# Patient Record
Sex: FEMALE | Race: NATIVE HAWAIIAN OR OTHER PACIFIC ISLANDER | NOT HISPANIC OR LATINO | Employment: FULL TIME | URBAN - METROPOLITAN AREA
[De-identification: names, ages, dates, MRNs, and addresses within clinical notes are randomized per-mention and may not be internally consistent; named-entity substitution may affect disease eponyms.]

---

## 2018-07-09 ENCOUNTER — TRANSFERRED RECORDS (OUTPATIENT)
Dept: HEALTH INFORMATION MANAGEMENT | Facility: CLINIC | Age: 33
End: 2018-07-09

## 2018-09-04 RX ORDER — CITALOPRAM HYDROBROMIDE 10 MG/1
10 TABLET ORAL DAILY
Status: ON HOLD | COMMUNITY
End: 2018-09-05

## 2018-09-05 ENCOUNTER — ANESTHESIA EVENT (OUTPATIENT)
Dept: SURGERY | Facility: CLINIC | Age: 33
End: 2018-09-05
Payer: COMMERCIAL

## 2018-09-05 ENCOUNTER — SURGERY (OUTPATIENT)
Age: 33
End: 2018-09-05

## 2018-09-05 ENCOUNTER — HOSPITAL ENCOUNTER (OUTPATIENT)
Facility: CLINIC | Age: 33
Discharge: HOME | End: 2018-09-05
Attending: OBSTETRICS & GYNECOLOGY | Admitting: OBSTETRICS & GYNECOLOGY
Payer: COMMERCIAL

## 2018-09-05 ENCOUNTER — ANESTHESIA (OUTPATIENT)
Dept: SURGERY | Facility: CLINIC | Age: 33
End: 2018-09-05
Payer: COMMERCIAL

## 2018-09-05 VITALS
BODY MASS INDEX: 35.75 KG/M2 | HEIGHT: 64 IN | RESPIRATION RATE: 18 BRPM | OXYGEN SATURATION: 96 % | WEIGHT: 209.4 LBS | SYSTOLIC BLOOD PRESSURE: 105 MMHG | TEMPERATURE: 97.2 F | DIASTOLIC BLOOD PRESSURE: 50 MMHG

## 2018-09-05 DIAGNOSIS — T40.2X5A CONSTIPATION DUE TO OPIOID THERAPY: ICD-10-CM

## 2018-09-05 DIAGNOSIS — G89.18 ACUTE POST-OPERATIVE PAIN: Primary | ICD-10-CM

## 2018-09-05 DIAGNOSIS — K59.03 CONSTIPATION DUE TO OPIOID THERAPY: ICD-10-CM

## 2018-09-05 PROBLEM — R10.2 PELVIC PAIN IN FEMALE: Status: ACTIVE | Noted: 2018-09-05

## 2018-09-05 LAB
B-HCG SERPL-ACNC: <1 IU/L (ref 0–5)
HGB BLD-MCNC: 12.3 G/DL (ref 11.7–15.7)

## 2018-09-05 PROCEDURE — 37000008 ZZH ANESTHESIA TECHNICAL FEE, 1ST 30 MIN: Performed by: OBSTETRICS & GYNECOLOGY

## 2018-09-05 PROCEDURE — 25000125 ZZHC RX 250: Performed by: OBSTETRICS & GYNECOLOGY

## 2018-09-05 PROCEDURE — 27210794 ZZH OR GENERAL SUPPLY STERILE: Performed by: OBSTETRICS & GYNECOLOGY

## 2018-09-05 PROCEDURE — 25000566 ZZH SEVOFLURANE, EA 15 MIN: Performed by: OBSTETRICS & GYNECOLOGY

## 2018-09-05 PROCEDURE — 25000128 H RX IP 250 OP 636: Performed by: NURSE ANESTHETIST, CERTIFIED REGISTERED

## 2018-09-05 PROCEDURE — 88305 TISSUE EXAM BY PATHOLOGIST: CPT | Mod: 26 | Performed by: OBSTETRICS & GYNECOLOGY

## 2018-09-05 PROCEDURE — 36000085 ZZH SURGERY LEVEL 8 1ST 30 MIN: Performed by: OBSTETRICS & GYNECOLOGY

## 2018-09-05 PROCEDURE — 25000125 ZZHC RX 250: Performed by: NURSE ANESTHETIST, CERTIFIED REGISTERED

## 2018-09-05 PROCEDURE — 25800025 ZZH RX 258: Performed by: OBSTETRICS & GYNECOLOGY

## 2018-09-05 PROCEDURE — 25000128 H RX IP 250 OP 636: Performed by: OBSTETRICS & GYNECOLOGY

## 2018-09-05 PROCEDURE — 71000012 ZZH RECOVERY PHASE 1 LEVEL 1 FIRST HR: Performed by: OBSTETRICS & GYNECOLOGY

## 2018-09-05 PROCEDURE — 27210995 ZZH RX 272: Performed by: OBSTETRICS & GYNECOLOGY

## 2018-09-05 PROCEDURE — 84702 CHORIONIC GONADOTROPIN TEST: CPT | Performed by: OBSTETRICS & GYNECOLOGY

## 2018-09-05 PROCEDURE — 36415 COLL VENOUS BLD VENIPUNCTURE: CPT | Performed by: OBSTETRICS & GYNECOLOGY

## 2018-09-05 PROCEDURE — 71000013 ZZH RECOVERY PHASE 1 LEVEL 1 EA ADDTL HR: Performed by: OBSTETRICS & GYNECOLOGY

## 2018-09-05 PROCEDURE — 88305 TISSUE EXAM BY PATHOLOGIST: CPT | Performed by: OBSTETRICS & GYNECOLOGY

## 2018-09-05 PROCEDURE — 85018 HEMOGLOBIN: CPT | Performed by: OBSTETRICS & GYNECOLOGY

## 2018-09-05 PROCEDURE — 36000087 ZZH SURGERY LEVEL 8 EA 15 ADDTL MIN: Performed by: OBSTETRICS & GYNECOLOGY

## 2018-09-05 PROCEDURE — 37000009 ZZH ANESTHESIA TECHNICAL FEE, EACH ADDTL 15 MIN: Performed by: OBSTETRICS & GYNECOLOGY

## 2018-09-05 PROCEDURE — 40000170 ZZH STATISTIC PRE-PROCEDURE ASSESSMENT II: Performed by: OBSTETRICS & GYNECOLOGY

## 2018-09-05 RX ORDER — LIDOCAINE HYDROCHLORIDE 20 MG/ML
INJECTION, SOLUTION INFILTRATION; PERINEURAL PRN
Status: DISCONTINUED | OUTPATIENT
Start: 2018-09-05 | End: 2018-09-05

## 2018-09-05 RX ORDER — MEPERIDINE HYDROCHLORIDE 25 MG/ML
12.5 INJECTION INTRAMUSCULAR; INTRAVENOUS; SUBCUTANEOUS
Status: DISCONTINUED | OUTPATIENT
Start: 2018-09-05 | End: 2018-09-05 | Stop reason: HOSPADM

## 2018-09-05 RX ORDER — HYDROMORPHONE HYDROCHLORIDE 1 MG/ML
.3-.5 INJECTION, SOLUTION INTRAMUSCULAR; INTRAVENOUS; SUBCUTANEOUS EVERY 10 MIN PRN
Status: DISCONTINUED | OUTPATIENT
Start: 2018-09-05 | End: 2018-09-05 | Stop reason: HOSPADM

## 2018-09-05 RX ORDER — ONDANSETRON 4 MG/1
4 TABLET, ORALLY DISINTEGRATING ORAL EVERY 6 HOURS PRN
Status: DISCONTINUED | OUTPATIENT
Start: 2018-09-05 | End: 2018-09-05 | Stop reason: HOSPADM

## 2018-09-05 RX ORDER — METOCLOPRAMIDE HYDROCHLORIDE 5 MG/ML
10 INJECTION INTRAMUSCULAR; INTRAVENOUS EVERY 6 HOURS PRN
Status: DISCONTINUED | OUTPATIENT
Start: 2018-09-05 | End: 2018-09-05 | Stop reason: HOSPADM

## 2018-09-05 RX ORDER — HYDROMORPHONE HYDROCHLORIDE 1 MG/ML
0.2 INJECTION, SOLUTION INTRAMUSCULAR; INTRAVENOUS; SUBCUTANEOUS
Status: DISCONTINUED | OUTPATIENT
Start: 2018-09-05 | End: 2018-09-05 | Stop reason: HOSPADM

## 2018-09-05 RX ORDER — NALOXONE HYDROCHLORIDE 0.4 MG/ML
.1-.4 INJECTION, SOLUTION INTRAMUSCULAR; INTRAVENOUS; SUBCUTANEOUS
Status: DISCONTINUED | OUTPATIENT
Start: 2018-09-05 | End: 2018-09-05 | Stop reason: HOSPADM

## 2018-09-05 RX ORDER — KETOROLAC TROMETHAMINE 30 MG/ML
INJECTION, SOLUTION INTRAMUSCULAR; INTRAVENOUS PRN
Status: DISCONTINUED | OUTPATIENT
Start: 2018-09-05 | End: 2018-09-05

## 2018-09-05 RX ORDER — SODIUM CHLORIDE, SODIUM LACTATE, POTASSIUM CHLORIDE, CALCIUM CHLORIDE 600; 310; 30; 20 MG/100ML; MG/100ML; MG/100ML; MG/100ML
INJECTION, SOLUTION INTRAVENOUS CONTINUOUS PRN
Status: DISCONTINUED | OUTPATIENT
Start: 2018-09-05 | End: 2018-09-05

## 2018-09-05 RX ORDER — CALCIUM CARBONATE 500 MG/1
1000 TABLET, CHEWABLE ORAL 4 TIMES DAILY PRN
Status: DISCONTINUED | OUTPATIENT
Start: 2018-09-05 | End: 2018-09-05 | Stop reason: HOSPADM

## 2018-09-05 RX ORDER — OXYCODONE AND ACETAMINOPHEN 5; 325 MG/1; MG/1
1-2 TABLET ORAL EVERY 4 HOURS PRN
Qty: 30 TABLET | Refills: 0 | Status: SHIPPED | OUTPATIENT
Start: 2018-09-05 | End: 2021-08-03

## 2018-09-05 RX ORDER — DEXAMETHASONE SODIUM PHOSPHATE 4 MG/ML
INJECTION, SOLUTION INTRA-ARTICULAR; INTRALESIONAL; INTRAMUSCULAR; INTRAVENOUS; SOFT TISSUE PRN
Status: DISCONTINUED | OUTPATIENT
Start: 2018-09-05 | End: 2018-09-05

## 2018-09-05 RX ORDER — PROPOFOL 10 MG/ML
INJECTION, EMULSION INTRAVENOUS PRN
Status: DISCONTINUED | OUTPATIENT
Start: 2018-09-05 | End: 2018-09-05

## 2018-09-05 RX ORDER — BUPIVACAINE HYDROCHLORIDE AND EPINEPHRINE 2.5; 5 MG/ML; UG/ML
INJECTION, SOLUTION INFILTRATION; PERINEURAL PRN
Status: DISCONTINUED | OUTPATIENT
Start: 2018-09-05 | End: 2018-09-05 | Stop reason: HOSPADM

## 2018-09-05 RX ORDER — ONDANSETRON 2 MG/ML
4 INJECTION INTRAMUSCULAR; INTRAVENOUS EVERY 30 MIN PRN
Status: DISCONTINUED | OUTPATIENT
Start: 2018-09-05 | End: 2018-09-05 | Stop reason: HOSPADM

## 2018-09-05 RX ORDER — ONDANSETRON 2 MG/ML
INJECTION INTRAMUSCULAR; INTRAVENOUS PRN
Status: DISCONTINUED | OUTPATIENT
Start: 2018-09-05 | End: 2018-09-05

## 2018-09-05 RX ORDER — KETOROLAC TROMETHAMINE 30 MG/ML
30 INJECTION, SOLUTION INTRAMUSCULAR; INTRAVENOUS EVERY 6 HOURS
Status: DISCONTINUED | OUTPATIENT
Start: 2018-09-05 | End: 2018-09-05 | Stop reason: HOSPADM

## 2018-09-05 RX ORDER — HYDROXYZINE HYDROCHLORIDE 25 MG/1
25 TABLET, FILM COATED ORAL EVERY 6 HOURS PRN
Qty: 30 TABLET | Refills: 0 | Status: SHIPPED | OUTPATIENT
Start: 2018-09-05 | End: 2021-08-03

## 2018-09-05 RX ORDER — EPHEDRINE SULFATE 50 MG/ML
INJECTION, SOLUTION INTRAMUSCULAR; INTRAVENOUS; SUBCUTANEOUS PRN
Status: DISCONTINUED | OUTPATIENT
Start: 2018-09-05 | End: 2018-09-05

## 2018-09-05 RX ORDER — METOCLOPRAMIDE 10 MG/1
10 TABLET ORAL EVERY 6 HOURS PRN
Status: DISCONTINUED | OUTPATIENT
Start: 2018-09-05 | End: 2018-09-05 | Stop reason: HOSPADM

## 2018-09-05 RX ORDER — MAGNESIUM HYDROXIDE 1200 MG/15ML
LIQUID ORAL PRN
Status: DISCONTINUED | OUTPATIENT
Start: 2018-09-05 | End: 2018-09-05 | Stop reason: HOSPADM

## 2018-09-05 RX ORDER — CEFAZOLIN SODIUM 1 G/3ML
1 INJECTION, POWDER, FOR SOLUTION INTRAMUSCULAR; INTRAVENOUS SEE ADMIN INSTRUCTIONS
Status: DISCONTINUED | OUTPATIENT
Start: 2018-09-05 | End: 2018-09-05 | Stop reason: HOSPADM

## 2018-09-05 RX ORDER — PROPOFOL 10 MG/ML
INJECTION, EMULSION INTRAVENOUS CONTINUOUS PRN
Status: DISCONTINUED | OUTPATIENT
Start: 2018-09-05 | End: 2018-09-05

## 2018-09-05 RX ORDER — FENTANYL CITRATE 50 UG/ML
25-50 INJECTION, SOLUTION INTRAMUSCULAR; INTRAVENOUS
Status: DISCONTINUED | OUTPATIENT
Start: 2018-09-05 | End: 2018-09-05 | Stop reason: HOSPADM

## 2018-09-05 RX ORDER — SODIUM CHLORIDE, SODIUM LACTATE, POTASSIUM CHLORIDE, CALCIUM CHLORIDE 600; 310; 30; 20 MG/100ML; MG/100ML; MG/100ML; MG/100ML
INJECTION, SOLUTION INTRAVENOUS CONTINUOUS
Status: DISCONTINUED | OUTPATIENT
Start: 2018-09-05 | End: 2018-09-05 | Stop reason: HOSPADM

## 2018-09-05 RX ORDER — OXYCODONE AND ACETAMINOPHEN 5; 325 MG/1; MG/1
1-2 TABLET ORAL EVERY 4 HOURS PRN
Status: DISCONTINUED | OUTPATIENT
Start: 2018-09-05 | End: 2018-09-05 | Stop reason: HOSPADM

## 2018-09-05 RX ORDER — ONDANSETRON 2 MG/ML
4 INJECTION INTRAMUSCULAR; INTRAVENOUS EVERY 6 HOURS PRN
Status: DISCONTINUED | OUTPATIENT
Start: 2018-09-05 | End: 2018-09-05 | Stop reason: HOSPADM

## 2018-09-05 RX ORDER — ONDANSETRON 4 MG/1
4 TABLET, ORALLY DISINTEGRATING ORAL EVERY 30 MIN PRN
Status: DISCONTINUED | OUTPATIENT
Start: 2018-09-05 | End: 2018-09-05 | Stop reason: HOSPADM

## 2018-09-05 RX ORDER — AMOXICILLIN 250 MG
1-2 CAPSULE ORAL 2 TIMES DAILY
Qty: 30 TABLET | Refills: 0 | Status: SHIPPED | OUTPATIENT
Start: 2018-09-05 | End: 2021-08-03

## 2018-09-05 RX ORDER — NEOSTIGMINE METHYLSULFATE 1 MG/ML
VIAL (ML) INJECTION PRN
Status: DISCONTINUED | OUTPATIENT
Start: 2018-09-05 | End: 2018-09-05

## 2018-09-05 RX ORDER — CEFAZOLIN SODIUM 2 G/100ML
2 INJECTION, SOLUTION INTRAVENOUS
Status: COMPLETED | OUTPATIENT
Start: 2018-09-05 | End: 2018-09-05

## 2018-09-05 RX ORDER — LIDOCAINE 40 MG/G
CREAM TOPICAL
Status: DISCONTINUED | OUTPATIENT
Start: 2018-09-05 | End: 2018-09-05 | Stop reason: HOSPADM

## 2018-09-05 RX ORDER — IBUPROFEN 600 MG/1
600 TABLET, FILM COATED ORAL EVERY 6 HOURS PRN
Qty: 30 TABLET | Refills: 0 | Status: SHIPPED | OUTPATIENT
Start: 2018-09-05 | End: 2021-08-03

## 2018-09-05 RX ORDER — GLYCOPYRROLATE 0.2 MG/ML
INJECTION, SOLUTION INTRAMUSCULAR; INTRAVENOUS PRN
Status: DISCONTINUED | OUTPATIENT
Start: 2018-09-05 | End: 2018-09-05

## 2018-09-05 RX ORDER — HYDROXYZINE HYDROCHLORIDE 25 MG/1
25 TABLET, FILM COATED ORAL EVERY 6 HOURS PRN
Status: DISCONTINUED | OUTPATIENT
Start: 2018-09-05 | End: 2018-09-05 | Stop reason: HOSPADM

## 2018-09-05 RX ORDER — FENTANYL CITRATE 50 UG/ML
INJECTION, SOLUTION INTRAMUSCULAR; INTRAVENOUS PRN
Status: DISCONTINUED | OUTPATIENT
Start: 2018-09-05 | End: 2018-09-05

## 2018-09-05 RX ORDER — IBUPROFEN 600 MG/1
600 TABLET, FILM COATED ORAL EVERY 6 HOURS PRN
Status: DISCONTINUED | OUTPATIENT
Start: 2018-09-06 | End: 2018-09-05 | Stop reason: HOSPADM

## 2018-09-05 RX ADMIN — KETOROLAC TROMETHAMINE 30 MG: 30 INJECTION, SOLUTION INTRAMUSCULAR at 19:45

## 2018-09-05 RX ADMIN — PROPOFOL 75 MCG/KG/MIN: 10 INJECTION, EMULSION INTRAVENOUS at 13:20

## 2018-09-05 RX ADMIN — CEFAZOLIN SODIUM 2 G: 2 INJECTION, SOLUTION INTRAVENOUS at 13:20

## 2018-09-05 RX ADMIN — FENTANYL CITRATE 50 MCG: 50 INJECTION, SOLUTION INTRAMUSCULAR; INTRAVENOUS at 13:35

## 2018-09-05 RX ADMIN — Medication 5 MG: at 13:49

## 2018-09-05 RX ADMIN — HYDROMORPHONE HYDROCHLORIDE 0.3 MG: 1 INJECTION, SOLUTION INTRAMUSCULAR; INTRAVENOUS; SUBCUTANEOUS at 14:39

## 2018-09-05 RX ADMIN — KETOROLAC TROMETHAMINE 30 MG: 30 INJECTION, SOLUTION INTRAMUSCULAR at 14:34

## 2018-09-05 RX ADMIN — HYDROMORPHONE HYDROCHLORIDE 0.5 MG: 1 INJECTION, SOLUTION INTRAMUSCULAR; INTRAVENOUS; SUBCUTANEOUS at 14:58

## 2018-09-05 RX ADMIN — ROCURONIUM BROMIDE 5 MG: 10 INJECTION INTRAVENOUS at 14:23

## 2018-09-05 RX ADMIN — MIDAZOLAM 2 MG: 1 INJECTION INTRAMUSCULAR; INTRAVENOUS at 13:15

## 2018-09-05 RX ADMIN — DEXAMETHASONE SODIUM PHOSPHATE 4 MG: 4 INJECTION, SOLUTION INTRA-ARTICULAR; INTRALESIONAL; INTRAMUSCULAR; INTRAVENOUS; SOFT TISSUE at 13:20

## 2018-09-05 RX ADMIN — ONDANSETRON 4 MG: 2 INJECTION INTRAMUSCULAR; INTRAVENOUS at 13:20

## 2018-09-05 RX ADMIN — ONDANSETRON 4 MG: 2 INJECTION INTRAMUSCULAR; INTRAVENOUS at 14:34

## 2018-09-05 RX ADMIN — WATER 3000 ML: 100 INJECTION, SOLUTION INTRAVENOUS at 14:45

## 2018-09-05 RX ADMIN — SODIUM CHLORIDE 1000 ML: 900 IRRIGANT IRRIGATION at 13:39

## 2018-09-05 RX ADMIN — BUPIVACAINE HYDROCHLORIDE AND EPINEPHRINE BITARTRATE 30 ML: 2.5; .005 INJECTION, SOLUTION EPIDURAL; INFILTRATION; INTRACAUDAL; PERINEURAL at 14:45

## 2018-09-05 RX ADMIN — HYDROMORPHONE HYDROCHLORIDE 0.2 MG: 1 INJECTION, SOLUTION INTRAMUSCULAR; INTRAVENOUS; SUBCUTANEOUS at 14:51

## 2018-09-05 RX ADMIN — PROPOFOL 200 MG: 10 INJECTION, EMULSION INTRAVENOUS at 13:17

## 2018-09-05 RX ADMIN — ROCURONIUM BROMIDE 5 MG: 10 INJECTION INTRAVENOUS at 14:28

## 2018-09-05 RX ADMIN — ROCURONIUM BROMIDE 50 MG: 10 INJECTION INTRAVENOUS at 13:17

## 2018-09-05 RX ADMIN — Medication 0.2 MG: at 17:37

## 2018-09-05 RX ADMIN — SODIUM CHLORIDE, POTASSIUM CHLORIDE, SODIUM LACTATE AND CALCIUM CHLORIDE: 600; 310; 30; 20 INJECTION, SOLUTION INTRAVENOUS at 14:24

## 2018-09-05 RX ADMIN — GLYCOPYRROLATE 0.8 MG: 0.2 INJECTION, SOLUTION INTRAMUSCULAR; INTRAVENOUS at 14:45

## 2018-09-05 RX ADMIN — SODIUM CHLORIDE, POTASSIUM CHLORIDE, SODIUM LACTATE AND CALCIUM CHLORIDE: 600; 310; 30; 20 INJECTION, SOLUTION INTRAVENOUS at 13:10

## 2018-09-05 RX ADMIN — NEOSTIGMINE METHYLSULFATE 5 MG: 1 INJECTION, SOLUTION INTRAVENOUS at 14:45

## 2018-09-05 RX ADMIN — LIDOCAINE HYDROCHLORIDE 40 MG: 20 INJECTION, SOLUTION INFILTRATION; PERINEURAL at 13:17

## 2018-09-05 RX ADMIN — FENTANYL CITRATE 50 MCG: 50 INJECTION, SOLUTION INTRAMUSCULAR; INTRAVENOUS at 13:15

## 2018-09-05 ASSESSMENT — PAIN DESCRIPTION - DESCRIPTORS: DESCRIPTORS: CRAMPING

## 2018-09-05 ASSESSMENT — ENCOUNTER SYMPTOMS: SEIZURES: 0

## 2018-09-05 ASSESSMENT — COPD QUESTIONNAIRES: COPD: 0

## 2018-09-05 NOTE — IP AVS SNAPSHOT
MRN:[id number]                      After Visit Summary   9/5/2018    Evita Fiore    MRN: [id number]           Thank you!     Thank you for choosing [facility] for your care. Our goal is always to provide you with excellent care. Hearing back from our patients is one way we can continue to improve our services. Please take a few minutes to complete the written survey that you may receive in the mail after you visit with us. Thank you!        Patient Information     Date Of Birth          1985        About your hospital stay     You were admitted on:  September 5, 2018 [facility]    You were discharged on:  September 5, 2018       Who to Call     For medical emergencies, please call 911.  For non-urgent questions about your medical care, please call your primary care provider or clinic, [phone]  For questions related to your surgery, please call your surgery clinic        Attending Provider     Provider Specialty    Onel Bergman MD OB/Gyn       Primary Care Provider Office Phone # Fax #    María BLANCA Arreola [phone]      After Care Instructions     Discharge Instructions       Follow up appointment as instructed by Surgeon and or RN            No lifting        No lifting over 25 lbs and no strenuous physical activity for 2 weeks            Shower       No shower for 24 hours post procedure. May shower Postoperative Day (POD)  1                  Pending Results     Date and Time Order Name Status Description    9/5/2018 1358 Surgical pathology exam In process             Statement of Approval     Ordered          09/05/18 1502  I have reviewed and agree with all the recommendations and orders detailed in this document.  EFFECTIVE NOW     Approved and electronically signed by:  Onel Bergman MD             Admission Information     Date & Time Provider Department Dept. Phone    9/5/2018  "Onel Bergman MD [facility] [phone]      Your Vitals Were     Blood Pressure Temperature Respirations Height Weight Last Period    105/50 (BP Location: Right arm) 97.2  F (36.2  C) (Oral) 18 1.626 m (5' 4\") 95 kg (209 lb 6.4 oz) 2018 (Approximate)    Pulse Oximetry BMI (Body Mass Index)                96% 35.94 kg/m2          [employer] Information     [employer] lets you send messages to your doctor, view your test results, renew your prescriptions, schedule appointments and more. To sign up, go to www.[facility]"[employer] . Click on \"Log in\" on the left side of the screen, which will take you to the Welcome page. Then click on \"Sign up Now\" on the right side of the page.     You will be asked to enter the access code listed below, as well as some personal information. Please follow the directions to create your username and password.     Your access code is: [email or web address]  Expires: 2018  6:34 PM     Your access code will  in 90 days. If you need help or a new code, please call your [facility] clinic or [phone].        Care EveryWhere ID     This is your Care EveryWhere ID. This could be used by other organizations to access your [facility] medical records  [id number]        Equal Access to Services     [facility]: Hadii betty العراقي, waaxda lumaureen, qaybta kaalmarubia fuentes . So [facility] [phone].    ATENCIÓN: Si habla español, tiene a jose disposición servicios gratuitos de asistencia lingüística. Lucy al [phone].    We comply with applicable federal civil rights laws and [place] laws. We do not discriminate on the basis of race, color, national origin, age, disability, sex, sexual orientation, or gender identity.               Review of your medicines      START taking        Dose / Directions    hydrOXYzine 25 MG tablet   Commonly known as:  ATARAX   Used for:  Acute post-operative pain        Dose:  25 mg "   Take 1 tablet (25 mg) by mouth every 6 hours as needed for itching (and nausea)   Quantity:  30 tablet   Refills:  0       ibuprofen 600 MG tablet   Commonly known as:  ADVIL/MOTRIN   Used for:  Acute post-operative pain        Dose:  600 mg   Take 1 tablet (600 mg) by mouth every 6 hours as needed for pain (mild)   Quantity:  30 tablet   Refills:  0       oxyCODONE-acetaminophen 5-325 MG per tablet   Commonly known as:  PERCOCET   Used for:  Acute post-operative pain        Dose:  1-2 tablet   Take 1-2 tablets by mouth every 4 hours as needed for pain (moderate to severe)   Quantity:  30 tablet   Refills:  0       senna-docusate 8.6-50 MG per tablet   Commonly known as:  SENOKOT-S;PERICOLACE   Used for:  Constipation due to opioid therapy        Dose:  1-2 tablet   Take 1-2 tablets by mouth 2 times daily Take while on oral narcotics to prevent or treat constipation.   Quantity:  30 tablet   Refills:  0         CONTINUE these medicines which have NOT CHANGED        Dose / Directions    SERTRALINE HCL PO        Dose:  150 mg   Take 150 mg by mouth daily (Takes 1.5 x 100mg tablet = 150mg dose)   Refills:  0         STOP taking     CYTOTEC PO                Where to get your medicines      Some of these will need a paper prescription and others can be bought over the counter. Ask your nurse if you have questions.     Bring a paper prescription for each of these medications     hydrOXYzine 25 MG tablet    ibuprofen 600 MG tablet    oxyCODONE-acetaminophen 5-325 MG per tablet    senna-docusate 8.6-50 MG per tablet                Protect others around you: Learn how to safely use, store and throw away your medicines at www.disposemymeds.org.        Information about OPIOIDS     PRESCRIPTION OPIOIDS: WHAT YOU NEED TO KNOW   We gave you an opioid (narcotic) pain medicine. It is important to manage your pain, but opioids are not always the best choice. You should first try all the other options your care team gave you.  Take this medicine for as short a time (and as few doses) as possible.    Some activities can increase your pain, such as bandage changes or therapy sessions. It may help to take your pain medicine 30 to 60 minutes before these activities. Reduce your stress by getting enough sleep, working on hobbies you enjoy and practicing relaxation or meditation. Talk to your care team about ways to manage your pain beyond prescription opioids.    These medicines have risks:    DO NOT drive when on new or higher doses of pain medicine. These medicines can affect your alertness and reaction times, and you could be arrested for driving under the influence (DUI). If you need to use opioids long-term, talk to your care team about driving.    DO NOT operate heavy machinery    DO NOT do any other dangerous activities while taking these medicines.    DO NOT drink any alcohol while taking these medicines.     If the opioid prescribed includes acetaminophen, DO NOT take with any other medicines that contain acetaminophen. Read all labels carefully. Look for the word  acetaminophen  or  Tylenol.  Ask your pharmacist if you have questions or are unsure.    You can get addicted to pain medicines, especially if you have a history of addiction (chemical, alcohol or substance dependence). Talk to your care team about ways to reduce this risk.    All opioids tend to cause constipation. Drink plenty of water and eat foods that have a lot of fiber, such as fruits, vegetables, prune juice, apple juice and high-fiber cereal. Take a laxative (Miralax, milk of magnesia, Colace, Senna) if you don t move your bowels at least every other day. Other side effects include upset stomach, sleepiness, dizziness, throwing up, tolerance (needing more of the medicine to have the same effect), physical dependence and slowed breathing.    Store your pills in a secure place, locked if possible. We will not replace any lost or stolen medicine. If you don t finish  your medicine, please throw away (dispose) as directed by your pharmacist. The [place] Pollution Control Agency has more information about safe disposal: https://www.pca.[facility].[place].us/living-green/managing-unwanted-medications             Medication List: This is a list of all your medications and when to take them. Check marks below indicate your daily home schedule. Keep this list as a reference.      Medications           Morning Afternoon Evening Bedtime As Needed    hydrOXYzine 25 MG tablet   Commonly known as:  ATARAX   Take 1 tablet (25 mg) by mouth every 6 hours as needed for itching (and nausea)                                   ibuprofen 600 MG tablet   Commonly known as:  ADVIL/MOTRIN   Take 1 tablet (600 mg) by mouth every 6 hours as needed for pain (mild)                                   oxyCODONE-acetaminophen 5-325 MG per tablet   Commonly known as:  PERCOCET   Take 1-2 tablets by mouth every 4 hours as needed for pain (moderate to severe)                                   senna-docusate 8.6-50 MG per tablet   Commonly known as:  SENOKOT-S;PERICOLACE   Take 1-2 tablets by mouth 2 times daily Take while on oral narcotics to prevent or treat constipation.                                      SERTRALINE HCL PO   Take 150 mg by mouth daily (Takes 1.5 x 100mg tablet = 150mg dose)                                             More Information        Taking Opioid Medicines  For your health and safety, it s important to take opioids exactly as directed. This helps ensure that the medicines work as they should. It also lowers the chances of side effects. And it lowers the risk of taking too high a dose (overdose). Each opioid medicine has its own instructions for use. Your healthcare provider will explain the ones you re prescribed and tell you how to take them. If you have questions or concerns, talk with your healthcare provider.   Using opioids safely  Opioids can work very well to relieve pain. But taking  too much, taking them too long, or taking them in the wrong way can be harmful. To help reduce the risks to your health, follow these safety tips:    Make sure you know if you are to take the medicine on a regular basis or only as needed.    If your medicine is taken on a regular basis, take it on time and at the right dose. If you miss a dose, don t double up the next dose.    Use a medicine log, tam, or calendar to keep track of when you take your medicine. This helps you stay on schedule and not miss doses or take extra doses.    When taking liquid doses, use a measuring spoon or dropper. This ensures you take the correct dose.    Tell your healthcare provider right away if you have any side effects.    Don t cut, crush, or change your medicine in any way.    Don t take someone else s opioids. Don t share yours with other people.    Don t drive while taking opioids.    Don t use dangerous equipment or power tools while taking opioids.    Check expiration dates regularly. Dispose of all  medicines properly.   Beware of combining medicines  Some medicines can be dangerous when used with opioids. In some cases, combining medicines can cause death. Make sure to tell your healthcare provider and pharmacist about all of the medicines you re taking. This includes over-the-counter medicines. It also includes vitamins, herbs, and other supplements. And it includes illegal or street drugs. Medicines that may be unsafe to use with opioids include:    Over-the-counter pain relievers, such as acetaminophen    Other prescription opioids    Benzodiazepines such as clonazepam or alprazolam    Muscle relaxants such as cyclobenzaprine or carisoprodol    Hypnotics such as sleep aids like zolpidem  WARNING: Don t take opioids with alcohol or street drugs. This can cause death.     Symptoms of opioid overdose  Opioids act on the part of the brain that affects breathing. An overdose of opioids can slow breathing down too much.  It can even stop your breathing. This can cause death. Call 911 right away if you think you or someone else has had an overdose.   Look for these 3 key symptoms:    The dark circles in the middle of the eyes are very small (pinpoint pupils)    Breathing is slow or stopped    The person has passed out and does not respond(is not conscious)  Other symptoms to look for include:    Limp body    Pale face    Cool, damp skin    Purple or blue tint to lips and fingernails    Vomiting   Storing opioids safely  Opioids need to be stored safely. This helps protect anyone else from accidentally taking the medicine. It also helps prevent the theft and misuse of the medicine. If possible, store the medicine in a locked container or cupboard that others cannot get to. Store the medicine in a cool dry place. Don t store it in a damp place, such as a bathroom. Always put the medicine back in its secure place after each use.   How to dispose opioids  Dispose of unused or  opioids in a safe way. This is to prevent harm to other people. Don t save your medicine or give it to other people for any reason. Even a single dose of opioids can lead to death if it used by someone else. To dispose of your medicine safely:    Find your [facility] medicine take-back program. You may need to drop the medicine off at a local police station or pharmacy.    Ask your pharmacy about a mail-back program. You may be able to send the medicine through the mail. This is done using a special envelope.  If these options are not available to you, ask your healthcare provider for help.                           The FDA also has guidelines for disposing of medicines. You may be able to flush them down the toilet. Or you may be able to put them in the trash. Check with your local water and waste management company to see what is allowed in your city or state. You can learn more here:  www.fda.gov/drugs/resourcesforyou/consumers/buyingusingmedicinesafely/ensuringsafeuseofmedicine/safedisposalofmedicines/bnd961934.htm. Before using these options, check with your local water and waste management company to determine if this is allowed in your city or state.    Stopping opioid treatment  If you have been taking an opioid for more than a few weeks, your body gets used to having it. When you stop taking the medicine, you can have withdrawal symptoms. There are many kinds of withdrawal symptoms. They can range from mild to severe. They can include:    Restlessness    Anxiety    Muscle aches    Sweating    Large (dilated) pupils    Watery eyes    Runny nose    Trouble sleeping    Nausea and vomiting    Abdominal cramping    Diarrhea    Fast heartbeat  Don t stop opioid medicine without help from your healthcare provider. You will need a plan to safely stop taking it. This is to help manage withdrawal symptoms. In most cases, the amount of medicine you take will be cut down. You will take less and less over several weeks. If needed, you may need to take other medicines and treatments to help with this process. As the opioid medicine leaves your body, your body will adapt. Your withdrawal symptoms should then go away. How long this takes can vary for each person.  Date Last Reviewed: 8/1/2017 2000-2017 [employer]. [address] [place] [address], [place] [address]. All rights reserved. This information is not intended as a substitute for professional medical care. Always follow your healthcare professional's instructions.                Managing Post-Op Pain at Home  Pain is expected after surgery. Know that you have a right to have this pain controlled. Managing pain helps you recover faster. Less pain means you can be active sooner. It also means less stress on the body and mind, which will help your body heal. When you go home after surgery, you will take charge of your pain management.  What  is post-op pain?  Pain after surgery (post-op pain) is normal. How much pain you feel depends on the surgery. Your use of pain medicines and your sensitivity to pain are also factors. Each person feels pain differently. So try not to compare your pain with someone else s. Your healthcare team will need to know how you are feeling. Be honest. If you are in pain, say so.  Measuring your pain  A pain scale helps you rate pain intensity. In the scale, 0 means no pain, and 10 is the worst pain possible. Pain scales are not used to compare your pain with another person's pain. A pain scale is used only to measure how your pain changes for you. You should rate your pain every few hours. You may feel some pain even with medicines. It is important to tell your healthcare provider if medicines don't reduce the pain. Be sure to mention if the pain suddenly increases or changes.     Your recovery  Your first hours at home, you may feel groggy or tired from the medicines given during surgery. As pain management methods used during surgery wear off, pain may increase. So be sure not to skip a dose of prescribed medicine. In fact, set an alarm or have someone remind you when it s time to take your medicine. During this time, try to rest, even if you feel pretty good. Within the first 24 hours, a nurse or other healthcare provider is likely to call and ask how you re doing.   Medicines for pain  Medicines can help to block pain, limit swelling and control related problems. You may be given more than one medicine to treat your pain. Medicines may be changed as you feel better, or if they cause side effects.   Pain medicine can be given in several ways: by injection, by mouth, as a patch, or as a suppository.  Medicines What they do Possible side effects   Non-steroidal anti-inflammatory drugs (NSAIDs) Reduce mild to moderate pain. They also help reduce swelling. Nausea, stomach and digestive problems, and kidney and liver problems.  Certain NSAIDs may increase the risk for cardiovascular disease in some people.   Opioids (morphine and similar medicines often called narcotics) Reduce moderate to severe pain Nausea, vomiting, itching, drowsiness, constipation, slowed or shallow breathing   Other pain relievers (analgesics) Reduce mild to severe pain Constipation, nausea, dizziness, drowsiness, kidney and liver problems   Anti-vomiting medicine (antiemetics) Manage nausea Dizziness, drowsiness, muscle spasms   Seizure medicines (anticonvulsants) Manage nerve-related pain Drowsiness, dizziness, liver problems   Medicines for depression (antidepressants) Manage chronic pain Dry mouth, drowsiness, dizziness, constipation   Non-medicine pain relief  Medicines are not the only way to manage pain after surgery.   You can use ice to help reduce swelling and pain. Use a cold pack or bag of ice cubes wrapped in a thin cloth. Never put ice directly on your skin. Use the ice for up to 20 minutes at a time every 3 to 4 hours.   You can also reduce swelling and pain by keeping the operated area above the level of your heart if you can. This helps blood and other fluids drain from the area.   You can also use relaxation to reduce pain. Try these techniques:    Visualization or guided imagery. You picture yourself in a quiet, peaceful place to help take your mind off the pain.    Progressive body relaxation. Starting at your feet, you clench and release your muscles. Work up your body slowly until you reach your neck and face.    Deep breathing. Breathe in deeply and hold your breath for a few seconds. Then exhale slowly to help relax your body.   Tips for controlling pain    Give pain medicine time to work. Most pain relievers taken by mouth need at least 20 to 30 minutes to take effect. They may not reach their maximum effect for close to an hour.     Take pain medicine at regular times as directed. Don t wait until the pain gets bad to take it.    Get plenty  of rest. Taking your medicine at night may help you get a good night s rest.    If pain lessens, try taking your medicine less often or in smaller doses.  Safety tips for taking pain medicines    Ask your pharmacist if you need to take the medicine with food or milk to avoid an upset stomach.    Don t break, crush, or cut in half any long-acting medicines. This could be harmful.    Don t take more medicine than directed. If your pain isn t relieved, call your healthcare provider.    Try to time your medicine so that you take it before starting an activity, such as dressing or sitting at the table for dinner.    Constipation is a common side effect with some pain medicines. Eating fruit, vegetables, and other foods high in fiber can help. Also drink plenty of fluids.    Don t drink alcohol while you are taking pain medicine. This can make you dizzy and slow your breathing. It can even be fatal.    Don t drive if you are taking opioid medicines.    Don t take opioids in combination with benzodiazepines. Doing so can cause serious health problems. These include extreme sleepiness, slowed breathing, and death. Let your healthcare provider know if you are taking benzodiazepines.     When to call your healthcare provider  Call your healthcare provider right away if:    Your pain is not relieved or if it gets worse    You can't take your pain medicines as prescribed    You have severe side effects like breathing problems, trouble waking up, dizziness, confusion, or severe constipation   Date Last Reviewed: 12/1/2017 2000-2017 [employer]. [address] [place] [address], [place] [address]. All rights reserved. This information is not intended as a substitute for professional medical care. Always follow your healthcare professional's instructions.                Discharge Instructions for Laparoscopic Hysterectomy  You had a procedure called laparoscopic hysterectomy. A surgeon removed your uterus using instruments  inserted through small incisions in your abdomen. These incisions may be sore. You may also have pain in your upper back or shoulders. This is from the gas used to enlarge your abdomen to allow your healthcare provider to see inside your pelvis and do the procedure. This pain usually goes away in a day or two. It usually takes from 1 to 4 weeks to recover from laparoscopic hysterectomy. Remember, though, that recovery time varies from woman to woman. Here's what you can do to speed your recovery after surgery.  Home care     Continue the coughing and deep breathing exercises that you learned in the hospital.    Take your medicines exactly as directed by your healthcare provider.    Avoid constipation.  ? Eat fruits, vegetables, and whole grains.  ? Drink 6 to 8 glasses of water a day, unless told to do otherwise.  ? Use a laxative or a mild stool softener if your doctor says it's OK.    Shower as usual. Wash your incisions with mild soap and water. Pat dry.    Don't use oils, powders, or lotions on your incisions.    Don't put anything in your vagina until your healthcare provider says it's safe to do so. Don't use tampons or douches. Don't have sex.    If you had both ovaries removed, report hot flashes, mood swings, and irritability to your healthcare provider. There may be medicines that can help you.  Activity    Ask your healthcare provider when you can start driving again. It's usually OK to drive as soon as you are free of pain and able to move comfortably from side to side. Don't drive while you are still taking opioid pain medicine.    Ask others to help with chores and errands while you recover.    Don t lift anything heavier than 10 pounds for 6 weeks.    Don t vacuum or do other strenuous activities until the healthcare provider says it's OK.    Walk as often as you feel able.    Climb stairs slowly and pause after every few steps.  Follow-up care  Make a follow-up appointment, or as directed.     When  to call your healthcare provider  Call your doctor right away if you have any of the following:    Fever above 100.4 F (38 C) or chills    Bright red vaginal bleeding or vaginal bleeding that soaks more than one sanitary pad per hour    A foul smelling discharge from the vagina    Trouble urinating or burning when you urinate    Severe pain or bloating in your abdomen    Redness, swelling, or drainage at your incision sites    Shortness of breath or chest pain    Nausea and vomiting   Date Last Reviewed: 11/1/2017 2000-2017 [employer]. [address] [place] [id number]. All rights reserved. This information is not intended as a substitute for professional medical care. Always follow your healthcare professional's instructions.

## 2018-09-05 NOTE — PLAN OF CARE
Problem: Patient Care Overview  Goal: Plan of Care/Patient Progress Review  Outcome: No Change  Pt arrived from PACU today at 1715. A/o. VSS. Pain at 6/10. Prn dilaudid given. Ice pack applied. Lap site CDI. On clear liquid diet. RA. capno on. IV infusing. PCD on. Will monitor

## 2018-09-05 NOTE — OR NURSING
PNDS Criteria met.  Order received per Dr Kumar to transfer to Observational Care for continued recovery.  Awaiting availability of room.

## 2018-09-05 NOTE — ANESTHESIA CARE TRANSFER NOTE
Patient: Evita Fiore    Procedure(s):  DAVINCI LAPAROSCOPY, EXCISION OF ENDOMETRIOSIS, BILATERAL URETEROLYSIS, HYSTEROSCOPY, DILATION AND CURETTAGE, CYSTOSCOPY - Wound Class: I-Clean   - Wound Class: II-Clean Contaminated   - Wound Class: II-Clean Contaminated    Diagnosis: PELVIC PAIN  Diagnosis Additional Information: No value filed.    Anesthesia Type:   General, ETT     Note:  Airway :Nasal Cannula  Patient transferred to:PACU  Handoff Report: Identifed the Patient, Identified the Reponsible Provider, Reviewed the pertinent medical history, Discussed the surgical course, Reviewed Intra-OP anesthesia mangement and issues during anesthesia, Set expectations for post-procedure period and Allowed opportunity for questions and acknowledgement of understanding      Vitals: (Last set prior to Anesthesia Care Transfer)    CRNA VITALS  9/5/2018 1428 - 9/5/2018 1508      9/5/2018             Resp Rate (observed): (!)  2    Resp Rate (set): 10                Electronically Signed By: NOEMI Rick CRNA  September 5, 2018  3:08 PM

## 2018-09-05 NOTE — ANESTHESIA PREPROCEDURE EVALUATION
Procedure: Procedure(s):  DAVINCI ASSISTED ABLATION / EXCISION OF ENDOMETRIOSIS  COMBINED DILATION AND CURETTAGE, HYSTEROSCOPY DIAGNOSTIC  CYSTOSCOPY  Preop diagnosis: PELVIC PAIN    Not on File  History reviewed. No pertinent past medical history.  Past Surgical History:   Procedure Laterality Date     ENT SURGERY      oral     GYN SURGERY      hysteroscopy, d and c     Social History   Substance Use Topics     Smoking status: Former Smoker     Packs/day: 1.00     Smokeless tobacco: Never Used      Comment: one pack every 3 days     Alcohol use Yes      Comment: once per month-4 drinks     Prior to Admission medications    Medication Sig Start Date End Date Taking? Authorizing Provider   SERTRALINE HCL PO Take 150 mg by mouth daily (Takes 1.5 x 100mg tablet = 150mg dose)   Yes Reported, Patient   MiSOPROStol (CYTOTEC PO) Take 200 mcg by mouth once    Reported, Patient     Current Facility-Administered Medications Ordered in Epic   Medication Dose Route Frequency Last Rate Last Dose     ceFAZolin (ANCEF) 1 g vial to attach to  ml bag for ADULT or 50 ml bag for PEDS  1 g Intravenous See Admin Instructions         ceFAZolin (ANCEF) intermittent infusion 2 g in 100 mL dextrose PRE-MIX  2 g Intravenous Pre-Op/Pre-procedure x 1 dose         No current [facility]-ordered outpatient prescriptions on file.       Wt Readings from Last 1 Encounters:   09/05/18 95 kg (209 lb 6.4 oz)     Temp Readings from Last 1 Encounters:   09/05/18 35.9  C (96.7  F) (Oral)     BP Readings from Last 6 Encounters:   09/05/18 (!) 146/104   09/04/11 144/90     Pulse Readings from Last 4 Encounters:   09/04/11 88     Resp Readings from Last 1 Encounters:   09/05/18 16     SpO2 Readings from Last 1 Encounters:   09/05/18 98%     No results for input(s): NA, POTASSIUM, CHLORIDE, CO2, ANIONGAP, GLC, BUN, CR, AURA in the last 37654 hours.  No results for input(s): AST, ALT, ALKPHOS, BILITOTAL, LIPASE in the last 63738 hours.  Recent Labs   Lab Test   09/05/18   1118   HGB  12.3         Anesthesia Evaluation     . Pt has had prior anesthetic.     No history of anesthetic complications          ROS/MED HX    ENT/Pulmonary:      (-) asthma, COPD and sleep apnea   Neurologic:      (-) seizures and CVA   Cardiovascular:        (-) dyslipidemia   METS/Exercise Tolerance:     Hematologic:         Musculoskeletal:         GI/Hepatic:        (-) GERD and liver disease   Renal/Genitourinary:      (-) renal disease   Endo:         Psychiatric:     (+) psychiatric history depression      Infectious Disease:         Malignancy:         Other:                     Physical Exam  Normal systems: dental    Airway   Mallampati: II  TM distance: >3 FB  Neck ROM: full    Dental     Cardiovascular   Rhythm and rate: regular      Pulmonary    breath sounds clear to auscultation                    Anesthesia Plan      History & Physical Review  History and physical reviewed and following examination; no interval change.    ASA Status:  1 .    NPO Status:  > 8 hours    Plan for General and ETT   PONV prophylaxis:  Ondansetron (or other 5HT-3) and Dexamethasone or Solumedrol  Propofol gtt       Postoperative Care      Consents  Anesthetic plan, risks, benefits and alternatives discussed with:  Patient..                          .

## 2018-09-05 NOTE — BRIEF OP NOTE
[facility] Brief Operative Note    Pre-operative diagnosis: PELVIC PAIN   Post-operative diagnosis Same, Deeply invasive endometriosis   Procedure: Procedure(s):  DAVINCI LAPAROSCOPY, EXCISION OF ENDOMETRIOSIS, BILATERAL URETEROLYSIS, HYSTEROSCOPY, DILATION AND CURETTAGE, CYSTOSCOPY - Wound Class: I-Clean   - Wound Class: II-Clean Contaminated   - Wound Class: II-Clean Contaminated   Surgeon(s): Surgeon(s) and Role:  Panel 1:     * Onel Bergman MD - Primary     * Sadaf Howard PA-C - Assisting    Panel 2:     * Onel Bergman MD - Primary   Estimated blood loss: 15    Specimens:   ID Type Source Tests Collected by Time Destination   A : LEFT UTEROSACRAL LIGAMENT Tissue Other SURGICAL PATHOLOGY EXAM Onel Bergman MD 9/5/2018  1:58 PM    B : LEFT OVARIAN FOSSA Tissue Other SURGICAL PATHOLOGY EXAM Onel Bergman MD 9/5/2018  2:01 PM    C : RIGHT OVARIAN FOSSA Tissue Other SURGICAL PATHOLOGY EXAM Oenl Bergman MD 9/5/2018  2:12 PM    D : POSTERIOR CUL DE SAC RECTOVAGINAL SEPTUM Tissue Other SURGICAL PATHOLOGY EXAM Onel Bergman MD 9/5/2018  2:23 PM    E : RECTOVAGINAL NODULE Tissue Other SURGICAL PATHOLOGY EXAM Onel Bergman MD 9/5/2018  2:27 PM    F : ENDOMETRIAL CURETTINGS Tissue Endometrium SURGICAL PATHOLOGY EXAM Onel Bergman MD 9/5/2018  2:41 PM       Findings: Normal anterior cul de sac  Normal appearing uterus  Normal upper abdomen  Normal tubes and ovaries bilaterally  White fibrotic and black endometriosis in left ovarian fossa  1 cm DIE nodule of left uterosacral ligament  White fibrotic type lesions in right ovarian fossa  1 cm DIE nodule in RV septum  Uterus had multiple polyps  Normal cystoscopy

## 2018-09-05 NOTE — IP AVS SNAPSHOT
[facility] Observation Unit    [address] [place] [id number]    Phone:  [phone]                                       After Visit Summary   9/5/2018    Evita Fiore    MRN: [id number]           After Visit Summary Signature Page     I have received my discharge instructions, and my questions have been answered. I have discussed any challenges I see with this plan with the nurse or doctor.    ..........................................................................................................................................  Patient/Patient Representative Signature      ..........................................................................................................................................  Patient Representative Print Name and Relationship to Patient    ..................................................               ................................................  Date                                            Time    ..........................................................................................................................................  Reviewed by Signature/Title    ...................................................              ..............................................  Date                                                            Time          22EPIC Rev 08/18

## 2018-09-05 NOTE — OR NURSING
Observational Care room now available.  Hand-off report to RN.  To OBS-5 per cart with all belongings.  Will transport with Capnography monitoring.

## 2018-09-05 NOTE — ANESTHESIA POSTPROCEDURE EVALUATION
Patient: Evita Fiore    Procedure(s):  DAVINCI LAPAROSCOPY, EXCISION OF ENDOMETRIOSIS, BILATERAL URETEROLYSIS, HYSTEROSCOPY, DILATION AND CURETTAGE, CYSTOSCOPY - Wound Class: I-Clean   - Wound Class: II-Clean Contaminated   - Wound Class: II-Clean Contaminated    Diagnosis:PELVIC PAIN  Diagnosis Additional Information: No value filed.    Anesthesia Type:  General, ETT    Note:  Anesthesia Post Evaluation    Patient location during evaluation: PACU  Patient participation: Able to fully participate in evaluation  Level of consciousness: awake, awake and alert and responsive to verbal stimuli  Pain management: adequate  Airway patency: patent  Cardiovascular status: acceptable  Respiratory status: acceptable  Hydration status: acceptable  PONV: none     Anesthetic complications: None          Last vitals:  Vitals:    09/05/18 1600 09/05/18 1615 09/05/18 1630   BP: 120/68 126/68 120/70   Resp: 10 (!) 31 9   Temp: 36.9  C (98.4  F) 36.9  C (98.4  F) 36.9  C (98.4  F)   SpO2: 97% 98% 97%         Electronically Signed By: Tatiana Kumar  September 5, 2018  5:01 PM

## 2018-09-05 NOTE — OP NOTE
Procedure Date: 09/05/2018      PREOPERATIVE DIAGNOSIS:  Pelvic pain.      POSTOPERATIVE DIAGNOSES:  Pelvic pain plus deeply invasive endometriosis.      PROCEDURES:   1.  Da Miguelina laparoscopy.   2.  Extensive excision of endometriosis.   3.  Bilateral ureterolysis.   4.  Hysteroscopy with dilatation and curettage.   5.  Cystoscopy.      SURGEON:  Onel Bergman MD      ASSISTANT:  Sadaf Hwoard PA-C      ANESTHESIA:  General.      ESTIMATED BLOOD LOSS:  50 mL.      COMPLICATIONS:  None.      FINDINGS:   1.  There was a normal anterior cul-de-sac.   2.  Normal-appearing uterus.   3.  Normal-appearing tubes and ovaries bilaterally.   4.  There was a normal upper abdomen without evidence of perihepatic adhesions or diaphragmatic endometriosis.   5.  There were spots of white fibrotic and black endometriosis in the left ovarian fossa.   6.  There was a 1 cm deeply invasive nodule involving the distal left uterosacral ligament.   7.  There were scattered white fibrotic type lesions in the right ovarian fossa.   8.  There was a 1 cm deeply invasive endometriosis nodule in the rectovaginal septum.     9.  On hysteroscopy, there were multiple intrauterine polyps.     10.  On cystoscopy, there was no evidence of bladder injury.  There was brisk flow of urine from both ureteral ostia and no evidence of interstitial cystitis.      INDICATIONS FOR PROCEDURE:  The patient is a 32-year-old female with a history of pelvic pain that has been persistent and not improved with more conservative medical measures.  Given this, the decision was to surgically evaluate for endometriosis.      DESCRIPTION OF PROCEDURE:  After administration of anesthesia, the patient was placed in the dorsal lithotomy position, prepped and draped in normal sterile fashion.  A Spain catheter was placed.  A speculum was placed in the vagina.  A single-tooth tenaculum was placed on the anterior lip of the cervix.  Cervical os was dilated with Hegar dilators.   A ZACHERY uterine manipulator was placed and we moved to the abdominal portion of the procedure.  A Veress needle was inserted in the left upper quadrant after first ensuring that there was an orogastric tube.  Pneumoperitoneum was established.        I injected the umbilicus with 0.25% Marcaine with epinephrine and an 8 mm incision was made.  A 5 mm blunt trocar was placed through the umbilicus to its correct intraperitoneal position.  I placed the 5 mm camera.  Under direct visualization, an 8 mm da Miguelina trocar was placed in the right lower quadrant, another 8 mm da Miguelina trocar placed in the left lower quadrant and an 8 mm AirSeal placed in the right upper quadrant as an assistant port.  I replaced the 5 mm trocar with an 8 mm da Miguelina camera trocar.  At this point, patient was placed into steep Trendelenburg and the table brought down as far as possible.  The da Miguelina robot was brought up and docked without complications.  A monopolar hook was placed in the right #1 arm, bipolar fenestrated grasper placed in the left #2 arm.  I scrubbed out and moved to the console.  The pelvis and abdomen were evaluated with the findings as previously mentioned.  After first evaluating the anterior cul-de-sac, I then deviated the uterus ventrally so I could evaluate the posterior compartment.  I had my assistant elevate the ovary on the left side although the uterus was deviated ventrally.  I went ahead and took down some physiologic adhesions of the rectosigmoid to improve my visualization of the left pelvic sidewall.  At this point, I noted the scattered endometriosis in the left ovarian fossa and right ovarian fossa as well as the nodular lesions.  Given the proximity to the ureter, I felt that ureterolysis was necessary to ensure the safety of the ureter.  I entered retroperitoneally at the pelvic brim and identified the ureter.  I elevated the peritoneum up and away from the ureter along its course along the pelvic sidewall.   With the ureter now out of harm's way, I was able to excise the peritoneum left and lateral of the ureter to the base of the ovary and then right and medial to the uterosacral ligament.  This peritoneum was removed en bloc and was sent for permanent pathology.  I then evaluated the nodule on the left uterosacral ligament and given its size, it was necessary to sacrifice the ligament and so this was excised in its entirety without complications.        I then moved to the right side.  In a similar fashion, I entered retroperitoneally at the pelvic brim, identified the ureter and then began to tent the peritoneum up and away from the ureter along its course along the right pelvic sidewall until it dove into the deeper tissues.  With the ureter now out of harm's way, I was able to excise the peritoneum right and lateral of the ureter to the base of the ovary and then left and medial to the uterosacral ligament.  This tissue was then removed and sent for permanent pathology.  I then directed my attention to the posterior cul-de-sac.  There was approximately a 1 cm nodular puckered area.  I started lateral to this and entered retroperitoneally and began to dissect the rectovaginal septum gently, staying in the fatty tissue around it and using virtually no electrocautery.  In this fashion, I was able to move around the nodular area and lift it up and away from underlying normal fatty tissue.  At no time was the rectum in any danger.  When this was completed, the nodule had been removed in its entirety.  This was removed and sent for permanent pathology.        At this point, all areas of abnormality had been removed.  I used some Surgicel powder in the posterior cul-de-sac where there was some oozing as well as a little on the right ovarian fossa.  Pneumoperitoneum was evacuated, instruments and trocars were removed, robot was undocked.  The skin was closed with 4-0 Vicryl in subcuticular fashion.  I scrubbed back in and  moved below.  I removed the uterine manipulator and placed the diagnostic hysteroscope.  I saw that there were multiple polypoid structures and I went ahead and did a circumferential curettage yielding a large amount of tissue.  I then removed the tenaculum and the speculum.  I removed the Spain catheter and placed a diagnostic cystoscope.  There was no evidence of bladder injury.  There was brisk flow of urine from both ureteral ostia and there was no evidence of interstitial cystitis.  The cystoscope was removed and the bladder drained.  A debriefing was performed, specimens identified and patient recalled from anesthesia without difficulty.         DEMARCO KING MD             D: 2018   T: 2018   MT: [id number]      Name:     KING CALLAWAY   MRN:      [id number]        Account:        [id number]   :      1985           Procedure Date: 2018      Document: [id number]

## 2018-09-06 LAB — COPATH REPORT: NORMAL

## 2018-09-06 NOTE — PLAN OF CARE
Problem: Patient Care Overview  Goal: Plan of Care/Patient Progress Review  Outcome: Adequate for Discharge Date Met: 09/05/18  A&O, VSS on RA. Voiding adequately in bathroom. Mild vaginal bleeding. Oral intake adequate, no nausea or vomiting. Pain well controlled. Discharge instructions reviewed with pt and spouse. Discharge meds given to pt. Discharged home with spouse via wheelchair.

## 2018-09-11 ENCOUNTER — HEALTH MAINTENANCE LETTER (OUTPATIENT)
Age: 33
End: 2018-09-11

## 2020-03-01 ENCOUNTER — HEALTH MAINTENANCE LETTER (OUTPATIENT)
Age: 35
End: 2020-03-01

## 2020-12-14 ENCOUNTER — HEALTH MAINTENANCE LETTER (OUTPATIENT)
Age: 35
End: 2020-12-14

## 2021-04-18 ENCOUNTER — HEALTH MAINTENANCE LETTER (OUTPATIENT)
Age: 36
End: 2021-04-18

## 2021-04-21 ENCOUNTER — TRANSFERRED RECORDS (OUTPATIENT)
Dept: MULTI SPECIALTY CLINIC | Facility: CLINIC | Age: 36
End: 2021-04-21

## 2021-04-21 LAB
HPV ABSTRACT: NORMAL
PAP-ABSTRACT: NORMAL

## 2021-08-03 ENCOUNTER — OFFICE VISIT (OUTPATIENT)
Dept: FAMILY MEDICINE | Facility: CLINIC | Age: 36
End: 2021-08-03
Payer: COMMERCIAL

## 2021-08-03 VITALS
HEIGHT: 66 IN | HEART RATE: 75 BPM | DIASTOLIC BLOOD PRESSURE: 77 MMHG | WEIGHT: 214 LBS | BODY MASS INDEX: 34.39 KG/M2 | OXYGEN SATURATION: 98 % | TEMPERATURE: 97.3 F | SYSTOLIC BLOOD PRESSURE: 144 MMHG | RESPIRATION RATE: 12 BRPM

## 2021-08-03 DIAGNOSIS — F90.2 ATTENTION DEFICIT HYPERACTIVITY DISORDER (ADHD), COMBINED TYPE: ICD-10-CM

## 2021-08-03 DIAGNOSIS — E04.1 THYROID NODULE: ICD-10-CM

## 2021-08-03 DIAGNOSIS — N80.9 ENDOMETRIOSIS: ICD-10-CM

## 2021-08-03 DIAGNOSIS — F41.1 ANXIETY STATE: ICD-10-CM

## 2021-08-03 DIAGNOSIS — R59.0 POSTERIOR CERVICAL ADENOPATHY: Primary | ICD-10-CM

## 2021-08-03 PROBLEM — R10.2 PELVIC PAIN IN FEMALE: Status: RESOLVED | Noted: 2018-09-05 | Resolved: 2021-08-03

## 2021-08-03 PROBLEM — F90.9 ATTENTION DEFICIT HYPERACTIVITY DISORDER (ADHD): Status: ACTIVE | Noted: 2021-04-22

## 2021-08-03 PROBLEM — E66.9 OBESITY: Status: ACTIVE | Noted: 2020-01-14

## 2021-08-03 PROBLEM — R21 RASH AND OTHER NONSPECIFIC SKIN ERUPTION: Status: ACTIVE | Noted: 2021-08-03

## 2021-08-03 PROCEDURE — 99214 OFFICE O/P EST MOD 30 MIN: CPT | Performed by: FAMILY MEDICINE

## 2021-08-03 RX ORDER — SERTRALINE HYDROCHLORIDE 100 MG/1
200 TABLET, FILM COATED ORAL DAILY
Qty: 180 TABLET | Refills: 1 | Status: SHIPPED | OUTPATIENT
Start: 2021-08-03 | End: 2022-01-25

## 2021-08-03 RX ORDER — METHYLPHENIDATE HYDROCHLORIDE 18 MG/1
18 TABLET ORAL EVERY MORNING
Qty: 30 TABLET | Refills: 0 | Status: SHIPPED | OUTPATIENT
Start: 2021-08-03 | End: 2021-09-07

## 2021-08-03 ASSESSMENT — MIFFLIN-ST. JEOR: SCORE: 1674.07

## 2021-08-03 NOTE — PROGRESS NOTES
"  Assessment & Plan    1. Posterior cervical adenopathy  Patient has noted a small mobile lump in her lower left neck - present x months.  Palpation reveals a very small lump with benign features, but given its persistence it is reasonable to have this further evaluated.  Will order a CT soft tissue neck scan.  Patient is agreeable.    - CT Soft Tissue Neck w Contrast; Future    2. Endometriosis  Patient relates a long journey through diagnosis of endometriosis.  She has had at least 2 previous surgeries and taking no medications - doing well currently.     3. Thyroid nodule  H/o thyroid nodule - no concerns on previous scans/workup.     4. Anxiety state  Patient has significant anxiety - will increase Sertraline dosing .  (had been stable previously, but feels that increased dowing might be reasonable).     - sertraline (ZOLOFT) 100 MG tablet; Take 2 tablets (200 mg) by mouth daily  Dispense: 180 tablet; Refill: 1    5. Attention deficit hyperactivity disorder (ADHD), combined type  Has used Methylphenidate previously - thinks it would be reasonable to restart - struggling at work   - methylphenidate HCl ER (CONCERTA) 18 MG CR tablet; Take 1 tablet (18 mg) by mouth every morning  Dispense: 30 tablet; Refill: 0      No follow-ups on file.    Chief Complaint   Patient presents with     Establish Care     lump on the neck      HPI  Last visit   Dr. Bergman at [facility] [place] - endometriosis - Stage 4  Had 2 surgeries - it was \"everywhere - attached to rectum/\"all my organs are attached together\"  Trying to manage that with diet/exercise    Keto - helps manage the inflammation and then helps the endometriosis  Also has her on Sertraline 150 mg (100 mg tabs, 1-1/2 tabs daily) - feels like it could be upped  Sees Dr. 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)  When taking meds, gets so much done in a day  Has " "been working from home - hard to concentrate    Lump on her neck -   Got her vaccine in April - (was actually April/May)  Found the lump b/c she was putting sun screen on; had a physical the next day  Watched this lump x 5 weeks  Still there -   Small, \"it moves\" - unchanged  Has pinched nerve in back - thinks they are connected, but has \"always\" had the pinched nerve -     Blood pressure - saw cardiologist - \"white coat\"               Patient Active Problem List   Diagnosis     Endometriosis     Anxiety state     Attention deficit hyperactivity disorder (ADHD)     Hypertension     Rash and other nonspecific skin eruption     Thyroid nodule     Obesity        No past medical history on file.     Current Outpatient Medications   Medication     methylphenidate HCl ER (CONCERTA) 18 MG CR tablet     sertraline (ZOLOFT) 100 MG tablet     No current facility-administered medications for this visit.        Past Surgical History:   Procedure Laterality Date     CYSTOSCOPY N/A 9/5/2018    Procedure: CYSTOSCOPY;;  Surgeon: Onel Bergman MD;  Location:  OR     DAVINCI ASSISTED ABLATION / EXCISION OF ENDOMETRIOSIS N/A 9/5/2018    Procedure: DAVINCI ASSISTED ABLATION / EXCISION OF ENDOMETRIOSIS;  DAVINCI LAPAROSCOPY, EXCISION OF ENDOMETRIOSIS, BILATERAL URETEROLYSIS, HYSTEROSCOPY, DILATION AND CURETTAGE, CYSTOSCOPY;  Surgeon: Onel Bergman MD;  Location:  OR     DILATION AND CURETTAGE, HYSTEROSCOPY DIAGNOSTIC, COMBINED N/A 9/5/2018    Procedure: COMBINED DILATION AND CURETTAGE, HYSTEROSCOPY DIAGNOSTIC;;  Surgeon: Onel Bergman MD;  Location:  OR     ENT SURGERY      oral     GYN SURGERY      hysteroscopy, d and c        Social History     Socioeconomic History     Marital status:      Spouse name: Not on file     Number of children: Not on file     Years of education: Not on file     Highest education level: Not on file   Occupational History     Not on file   Tobacco Use     Smoking " "status: Former Smoker     Packs/day: 1.00     Smokeless tobacco: Never Used     Tobacco comment: one pack every 3 days   Substance and Sexual Activity     Alcohol use: Yes     Comment: once per month-4 drinks     Drug use: No     Sexual activity: Yes     Partners: Female     Birth control/protection: Surgical     Comment:  with vasectomy   Other Topics Concern     Parent/sibling w/ CABG, MI or angioplasty before 65F 55M? Not Asked   Social History Narrative     Not on file     Social Determinants of Health     Financial Resource Strain:      Difficulty of Paying Living Expenses:    Food Insecurity:      Worried About Running Out of Food in the Last Year:      Ran Out of Food in the Last Year:    Transportation Needs:      Lack of Transportation (Medical):      Lack of Transportation (Non-Medical):    Physical Activity:      Days of Exercise per Week:      Minutes of Exercise per Session:    Stress:      Feeling of Stress :    Social Connections:      Frequency of Communication with Friends and Family:      Frequency of Social Gatherings with Friends and Family:      Attends Gn[employer] Services:      Active Member of Clubs or Organizations:      Attends Club or Organization Meetings:      Marital Status:    Intimate Partner Violence:      Fear of Current or Ex-Partner:      Emotionally Abused:      Physically Abused:      Sexually Abused:         No family history on file.     Review of Systems   Gastrointestinal: Negative for abdominal pain.   Hematological: Positive for adenopathy (lump at base of left mid-posterior neck).   Psychiatric/Behavioral: Positive for decreased concentration.   All other systems reviewed and are negative.       BP (!) 144/77 (BP Location: Right arm, Patient Position: Sitting, Cuff Size: Adult Regular)   Pulse 75   Temp 97.3  F (36.3  C) (Temporal)   Resp 12   Ht 1.671 m (5' 5.79\")   Wt 97.1 kg (214 lb)   LMP 07/20/2021   SpO2 98%   Breastfeeding Yes   BMI 34.76 kg/m   "     Physical Exam  Constitutional:       Appearance: Normal appearance.      Comments: Mildly anxious   HENT:      Head: Normocephalic and atraumatic.      Right Ear: Tympanic membrane, ear canal and external ear normal.      Left Ear: Tympanic membrane, ear canal and external ear normal.      Nose: Nose normal.      Mouth/Throat:      Mouth: Mucous membranes are dry.      Pharynx: Oropharynx is clear. No posterior oropharyngeal erythema.   Eyes:      Extraocular Movements: Extraocular movements intact.      Conjunctiva/sclera: Conjunctivae normal.   Cardiovascular:      Rate and Rhythm: Normal rate and regular rhythm.      Pulses: Normal pulses.      Heart sounds: Normal heart sounds. No murmur heard.     Pulmonary:      Effort: Pulmonary effort is normal.      Breath sounds: Normal breath sounds.   Abdominal:      General: Bowel sounds are normal.      Palpations: There is no mass.      Tenderness: There is no abdominal tenderness. There is no guarding or rebound.   Musculoskeletal:         General: No swelling, tenderness or deformity.      Cervical back: Normal range of motion and neck supple.   Lymphadenopathy:      Cervical: No cervical adenopathy (small soft nontender mobile node in left mid/posterior cervical chain).   Skin:     General: Skin is warm and dry.   Neurological:      General: No focal deficit present.      Mental Status: She is alert.   Psychiatric:         Mood and Affect: Mood normal.         Behavior: Behavior normal.          Results:  No results found for any visits on 08/03/21.    Medications at Conclusion of Visit:  Current Outpatient Medications   Medication Sig Dispense Refill     methylphenidate HCl ER (CONCERTA) 18 MG CR tablet Take 1 tablet (18 mg) by mouth every morning 30 tablet 0     sertraline (ZOLOFT) 100 MG tablet Take 2 tablets (200 mg) by mouth daily 180 tablet 1         WILLIAN NEAL MD

## 2021-08-08 ASSESSMENT — ENCOUNTER SYMPTOMS
ABDOMINAL PAIN: 0
ADENOPATHY: 1
DECREASED CONCENTRATION: 1

## 2021-08-16 ENCOUNTER — HOSPITAL ENCOUNTER (OUTPATIENT)
Dept: CT IMAGING | Facility: HOSPITAL | Age: 36
Discharge: HOME | End: 2021-08-16
Attending: FAMILY MEDICINE | Admitting: FAMILY MEDICINE
Payer: COMMERCIAL

## 2021-08-16 DIAGNOSIS — R59.0 POSTERIOR CERVICAL ADENOPATHY: ICD-10-CM

## 2021-08-16 PROCEDURE — 70491 CT SOFT TISSUE NECK W/DYE: CPT

## 2021-08-16 PROCEDURE — 250N000011 HC RX IP 250 OP 636: Performed by: FAMILY MEDICINE

## 2021-08-16 RX ORDER — IOPAMIDOL 755 MG/ML
100 INJECTION, SOLUTION INTRAVASCULAR ONCE
Status: COMPLETED | OUTPATIENT
Start: 2021-08-16 | End: 2021-08-16

## 2021-08-16 RX ADMIN — IOPAMIDOL 100 ML: 755 INJECTION, SOLUTION INTRAVENOUS at 07:29

## 2021-09-07 ENCOUNTER — MYC REFILL (OUTPATIENT)
Dept: FAMILY MEDICINE | Facility: CLINIC | Age: 36
End: 2021-09-07

## 2021-09-07 DIAGNOSIS — F90.2 ATTENTION DEFICIT HYPERACTIVITY DISORDER (ADHD), COMBINED TYPE: ICD-10-CM

## 2021-09-08 RX ORDER — METHYLPHENIDATE HYDROCHLORIDE 18 MG/1
18 TABLET ORAL EVERY MORNING
Qty: 30 TABLET | Refills: 0 | Status: SHIPPED | OUTPATIENT
Start: 2021-09-08 | End: 2023-02-08

## 2021-09-08 NOTE — TELEPHONE ENCOUNTER
Former patient of Elba & has not established care with another provider.  Please assign refill request to covering provider per clinic standard process.    Routing refill request to provider for review/approval because:  Controlled substance request  No pcp    Last Written Prescription Date:  8/3/21  Last Fill Quantity: 30,  # refills: 0   Last office visit provider:  8/3/21     Requested Prescriptions   Pending Prescriptions Disp Refills     methylphenidate HCl ER (CONCERTA) 18 MG CR tablet 30 tablet 0     Sig: Take 1 tablet (18 mg) by mouth every morning       There is no refill protocol information for this order          Meir Hoffman RN 09/08/21 10:23 AM

## 2021-10-02 ENCOUNTER — HEALTH MAINTENANCE LETTER (OUTPATIENT)
Age: 36
End: 2021-10-02

## 2022-01-23 DIAGNOSIS — F41.1 ANXIETY STATE: ICD-10-CM

## 2022-01-25 RX ORDER — SERTRALINE HYDROCHLORIDE 100 MG/1
TABLET, FILM COATED ORAL
Qty: 180 TABLET | Refills: 1 | Status: SHIPPED | OUTPATIENT
Start: 2022-01-25 | End: 2023-02-08 | Stop reason: DRUGHIGH

## 2022-01-25 NOTE — TELEPHONE ENCOUNTER
"Ok to refill or does pt need follow up appt?      Last Written Prescription Date:  08/03/2021  Last Fill Quantity: 180,  # refills: 1   Last office visit provider:   08/03/2021 with dr Arnold.    Requested Prescriptions   Pending Prescriptions Disp Refills     sertraline (ZOLOFT) 100 MG tablet [Pharmacy Med Name: SERTRALINE 100MG TABLETS] 180 tablet 1     Sig: TAKE 2 TABLETS(200 MG) BY MOUTH DAILY       SSRIs Protocol Passed - 1/23/2022  8:00 AM        Passed - Recent (12 mo) or future (30 days) visit within the authorizing provider's specialty     Patient has had an office visit with the authorizing provider or a provider within the authorizing providers department within the previous 12 mos or has a future within next 30 days. See \"Patient Info\" tab in inbasket, or \"Choose Columns\" in Meds & Orders section of the refill encounter.              Passed - Medication is active on med list        Passed - Patient is age 18 or older        Passed - No active pregnancy on record        Passed - No positive pregnancy test in last 12 months             Deirdre Enciso 01/25/22 11:16 AM  "

## 2022-05-14 ENCOUNTER — HEALTH MAINTENANCE LETTER (OUTPATIENT)
Age: 37
End: 2022-05-14

## 2022-06-29 ENCOUNTER — TELEPHONE (OUTPATIENT)
Dept: FAMILY MEDICINE | Facility: CLINIC | Age: 37
End: 2022-06-29

## 2022-06-29 ENCOUNTER — VIRTUAL VISIT (OUTPATIENT)
Dept: FAMILY MEDICINE | Facility: CLINIC | Age: 37
End: 2022-06-29
Payer: COMMERCIAL

## 2022-06-29 DIAGNOSIS — U07.1 LAB TEST POSITIVE FOR DETECTION OF COVID-19 VIRUS: Primary | ICD-10-CM

## 2022-06-29 DIAGNOSIS — E66.09 CLASS 2 OBESITY DUE TO EXCESS CALORIES WITHOUT SERIOUS COMORBIDITY WITH BODY MASS INDEX (BMI) OF 35.0 TO 35.9 IN ADULT: ICD-10-CM

## 2022-06-29 DIAGNOSIS — E66.812 CLASS 2 OBESITY DUE TO EXCESS CALORIES WITHOUT SERIOUS COMORBIDITY WITH BODY MASS INDEX (BMI) OF 35.0 TO 35.9 IN ADULT: ICD-10-CM

## 2022-06-29 PROCEDURE — 99213 OFFICE O/P EST LOW 20 MIN: CPT | Mod: GT | Performed by: FAMILY MEDICINE

## 2022-06-29 NOTE — PATIENT INSTRUCTIONS
"  Instructions for Patients  Your COVID-19 test was positive. This means you have the virus. Please follow the \"How can I take care of myself\" and \"How do I self-isolate?\" guidelines in these instructions.    What treatments are available?  Over-the-counter medicines may help with your symptoms such as runny or stuffy nose, cough, chills, and fever. Talk to your care team about your options.     Some people are at high risk for severe illness (for example if you have a weak immune system, you're 65 or older, or you have certain medical problems). If your risk it high and your symptoms started in the last 5 to 7 days, we strongly recommend for you to get COVID treatment as soon as possible before you get really sick. Paxlovid, Molnupiravir and the monoclonal antibody treatments are proven safe and effective, make you feel better faster, and prevent hospitalization and death.       You can schedule an appointment to discuss COVID treatment by requesting an appointment on [employer] by selecting \"schedule COVID-19 Treatment\" or by calling [employer] ([phone]) and pressing 7.    What are the symptoms of COVID-19?  Symptoms can include fever, cough, shortness of breath, chills, headache, muscle pain sore throat, fatigue, runny or stuffy nose, and loss of taste and smell. Other less common symptoms include nausea, vomiting, or diarrhea (watery stools).    Know when to call 911. Emergency warning signs include:    Trouble breathing or shortness of breath    Pain or pressure in the chest that doesn't go away    Feeling confused like you haven't felt before, or not being able to wake up    Bluish-colored lips or face    How can I take care of myself?  1. Get lots of rest. Drink extra fluids (unless a doctor has told you not to).  2. Take Tylenol (acetaminophen) for fever or pain. If you have liver or kidney problems, ask your family doctor if it's okay to take Tylenol   Adults:   650 mg (two 325 mg pills or tablets) " every 4 to 6 hours, or...   1,000 mg (two 500 mg pills or tablets) every 8 hours as needed.  Note: Don't take more than 3,000 mg in one day. Acetaminophen is found in many medicines (both prescribed and over the counter). Read all labels to be sure you don't take too much.  For children, check the Tylenol bottle for the right dose. The dose is based on the child's age or weight.  3. Take over the counter medicines for your symptoms as needed. Talk to your pharmacist.  4. If you have other health problems (like cancer, heart failure, an organ transplant, or severe kidney disease): Call your specialty clinic if you don't feel better in the next 2 days.    These guidelines are for isolating and quarantining before returning to work, school or .     For employers, schools and day cares: This is an official notice for this person s medical guidelines for returning in-person.     For health care sites: The CDC gives different isolation and quarantine guidelines for healthcare sites, please check with these sites before arriving.     How do I self-isolate?  You isolate when you have symptoms of COVID or a test shows you have COVID, even if you don t have symptoms.     If you DO have symptoms:  o Stay home and away from others  - For at least 5 days after your symptoms started, AND   - You are fever free for 24 hours (with no medicine that reduces fever), AND  - Your other symptoms are better.  o Wear a mask for 10 full days any time you are around others.    If you DON T have symptoms:  o Stay at home and away from others for at least 5 days after your positive test.  o Wear a mask for 10 full days any time you are around others.    How and when do I quarantine?  You quarantine when you may have been exposed to the virus and DON T have symptoms.     Stay home and away from others.     You must quarantine for 5 days after your last contact with a person who has COVID.  o Note: If you are fully vaccinated, you don t  need to quarantine. You should still follow the steps below.     Wear a mask for 10 full days any time you re around others.    Get tested at least 5 days after you were exposed, even if you don t have symptoms.     If you start to have symptoms, isolate right away and get tested.    Where can I get more information?    [facility] COVID-19 Resource Hub: www.[employer].org/covid19/     CDC Quarantine & Isolation: https://www.cdc.gov/coronavirus/2019-ncov/your-health/quarantine-isolation.html     CDC - What to Do If You're Sick: https://www.cdc.gov/coronavirus/2019-ncov/if-you-are-sick/index.html    [facility] clinical trials (COVID-19 research studies): clinicalaffairs.[facility]/umn-clinical-trials    [place] Department of Health COVID-19 Public Hotline: 1-266.766.7365

## 2022-06-29 NOTE — PROGRESS NOTES
"Evita is a 36 year old who is being evaluated via a billable video visit.      How would you like to obtain your AVS? MyChart  If the video visit is dropped, the invitation should be resent by: Text to cell phone: [phone]  Will anyone else be joining your video visit? No        Assessment & Plan     Lab test positive for detection of COVID-19 virus  High risk with obesity  -discussed risks, benefits, and side effects of this new medication. Patient understands and is in agreement.  - nirmatrelvir and ritonavir (PAXLOVID) therapy pack; Take 3 tablets by mouth 2 times daily for 5 days Take 2 Nirmatrelvir tablets and 1 Ritonavir tablet twice daily for 5 days.        Class 2 obesity due to excess calories without serious comorbidity with body mass index (BMI) of 35.0 to 35.9 in adult       BMI:   Estimated body mass index is 34.76 kg/m  as calculated from the following:    Height as of 8/3/21: 1.671 m (5' 5.79\").    Weight as of 8/3/21: 97.1 kg (214 lb).   Weight management plan: Discussed healthy diet and exercise guidelines    See Patient Instructions    No follow-ups on file.    Rubin Short MD  [facility]    Subjective   Evita is a 36 year old accompanied by her self., presenting for the following health issues:  No chief complaint on file.      HPI     Chief Complaint   Patient presents with     Suspected Covid     Positive test 6/28/22     Health Maintenance     Patient is aware she is due for a TDaP         COVID-19 Symptom Review  How many days ago did these symptoms start? X 2 days Monday  Vaccinated for COVID  Are any of the following symptoms significant for you?    New or worsening difficulty breathing? No    Worsening cough? Yes, it's a dry cough.     Fever or chills? Yes, the highest temperature was 100.9 last night    Headache: YES    Sore throat: YES    Chest pain: no    Diarrhea: no    Body aches? YES- off and on  Chest   What treatments has patient tried? Tylenol, " ibuprofen, darlene seltzer cold, mucinex   Does patient live in a nursing home, group home, or shelter? no  Does patient have a way to get food/medications during quarantined? Yes, I have a friend or family member who can help me.    Pregnancy:  with vasectomy. Not planning pregnancy.          Review of Systems       Objective           Vitals:  No vitals were obtained today due to virtual visit.    Physical Exam   GENERAL: Healthy, alert and no distress  EYES: Eyes grossly normal to inspection.  No discharge or erythema, or obvious scleral/conjunctival abnormalities.  RESP: cough, no wheezing. No visible cyanosis.  No visible retractions or increased work of breathing.    SKIN: Visible skin clear. No significant rash, abnormal pigmentation or lesions.  NEURO: Cranial nerves grossly intact.  Mentation and speech appropriate for age.  PSYCH: Mentation appears normal, affect normal/bright, judgement and insight intact, normal speech and appearance well-groomed.            Video-Visit Details    Video Start Time: 2:00 PM    Type of service:  Video Visit    Video End Time:2:14 PM    Originating Location (pt. Location): Home    Distant Location (provider location):  [facility]     Platform used for Video Visit: [id number]  Stevie

## 2022-09-03 ENCOUNTER — HEALTH MAINTENANCE LETTER (OUTPATIENT)
Age: 37
End: 2022-09-03

## 2022-11-04 NOTE — PROGRESS NOTES
Admission medication history interview status for the 9/5/2018  admission is complete. See EPIC admission navigator for prior to admission medications     Medication history source reliability:Good    Medication history interview source(s):Patient    Medication history resources (including written lists, pill bottles, clinic record):None    Primary pharmacy.    Additional medication history information not noted on PTA med list :None    Time spent in this activity: 45 MINUTES    Prior to Admission medications    Medication Sig Last Dose Taking? Auth Provider   SERTRALINE HCL PO Take 150 mg by mouth daily (Takes 1.5 x 100mg tablet = 150mg dose) 9/4/2018 at 2100 Yes Reported, Patient   MiSOPROStol (CYTOTEC PO) Take 200 mcg by mouth once 9/4/2018 at 2100  Reported, Patient          

## 2023-01-30 DIAGNOSIS — F41.1 ANXIETY STATE: ICD-10-CM

## 2023-01-31 RX ORDER — SERTRALINE HYDROCHLORIDE 100 MG/1
200 TABLET, FILM COATED ORAL DAILY
Qty: 180 TABLET | Refills: 0 | OUTPATIENT
Start: 2023-01-31

## 2023-01-31 NOTE — TELEPHONE ENCOUNTER
"Former patient of Herbarbara & has not established care with another provider.  Please assign refill request to covering provider per clinic standard process.    Routing refill request to provider for review/approval because:  A break in medication  Patient needs to be seen because it has been more than 1 year since last office visit.  No PCP    Last Written Prescription Date:  1/25/22  Last Fill Quantity: 180,  # refills: 1   Last office visit provider:  8/3/21     Requested Prescriptions   Pending Prescriptions Disp Refills     sertraline (ZOLOFT) 100 MG tablet [Pharmacy Med Name: SERTRALINE 100MG TABLETS] 180 tablet 1     Sig: TAKE 2 TABLETS(200 MG) BY MOUTH DAILY       SSRIs Protocol Passed - 1/31/2023  1:40 PM        Passed - Recent (12 mo) or future (30 days) visit within the authorizing provider's specialty     Patient has had an office visit with the authorizing provider or a provider within the authorizing providers department within the previous 12 mos or has a future within next 30 days. See \"Patient Info\" tab in inbasket, or \"Choose Columns\" in Meds & Orders section of the refill encounter.              Passed - Medication is active on med list        Passed - Patient is age 18 or older        Passed - No active pregnancy on record        Passed - No positive pregnancy test in last 12 months             Meir Hoffman RN 01/31/23 1:40 PM  "

## 2023-02-01 DIAGNOSIS — F41.1 ANXIETY STATE: ICD-10-CM

## 2023-02-02 RX ORDER — SERTRALINE HYDROCHLORIDE 100 MG/1
TABLET, FILM COATED ORAL
Qty: 180 TABLET | Refills: 1 | OUTPATIENT
Start: 2023-02-02

## 2023-02-02 NOTE — TELEPHONE ENCOUNTER
"Routing refill request to provider for review/approval because  Duplicate request    Last Written Prescription Date:  1/25/2022  Last Fill Quantity: 180,  # refills: 1   Last office visit provider:  6/29/2022     Requested Prescriptions   Pending Prescriptions Disp Refills     sertraline (ZOLOFT) 100 MG tablet [Pharmacy Med Name: SERTRALINE 100MG TABLETS] 180 tablet 1     Sig: TAKE 2 TABLETS(200 MG) BY MOUTH DAILY       SSRIs Protocol Passed - 2/1/2023  2:39 PM        Passed - Recent (12 mo) or future (30 days) visit within the authorizing provider's specialty     Patient has had an office visit with the authorizing provider or a provider within the authorizing providers department within the previous 12 mos or has a future within next 30 days. See \"Patient Info\" tab in inbasket, or \"Choose Columns\" in Meds & Orders section of the refill encounter.              Passed - Medication is active on med list        Passed - Patient is age 18 or older        Passed - No active pregnancy on record        Passed - No positive pregnancy test in last 12 months             Evita Barnes RN 02/02/23 1:11 PM      "

## 2023-02-06 NOTE — TELEPHONE ENCOUNTER
Pt called back, need a refill ASAP as she only has 1 left. Please send RX to pharmacy on file, thanks!

## 2023-02-08 ENCOUNTER — OFFICE VISIT (OUTPATIENT)
Dept: FAMILY MEDICINE | Facility: CLINIC | Age: 38
End: 2023-02-08
Payer: COMMERCIAL

## 2023-02-08 VITALS
OXYGEN SATURATION: 99 % | WEIGHT: 216 LBS | HEIGHT: 66 IN | HEART RATE: 61 BPM | RESPIRATION RATE: 18 BRPM | DIASTOLIC BLOOD PRESSURE: 88 MMHG | BODY MASS INDEX: 34.72 KG/M2 | SYSTOLIC BLOOD PRESSURE: 136 MMHG

## 2023-02-08 DIAGNOSIS — F41.1 ANXIETY STATE: ICD-10-CM

## 2023-02-08 DIAGNOSIS — Z11.4 SCREENING FOR HIV (HUMAN IMMUNODEFICIENCY VIRUS): ICD-10-CM

## 2023-02-08 DIAGNOSIS — Z11.59 NEED FOR HEPATITIS C SCREENING TEST: ICD-10-CM

## 2023-02-08 DIAGNOSIS — Z23 NEED FOR PROPHYLACTIC VACCINATION AGAINST DIPHTHERIA AND TETANUS: ICD-10-CM

## 2023-02-08 DIAGNOSIS — E66.812 CLASS 2 OBESITY DUE TO EXCESS CALORIES WITHOUT SERIOUS COMORBIDITY WITH BODY MASS INDEX (BMI) OF 35.0 TO 35.9 IN ADULT: ICD-10-CM

## 2023-02-08 DIAGNOSIS — R63.8 UNABLE TO LOSE WEIGHT: Primary | ICD-10-CM

## 2023-02-08 DIAGNOSIS — E66.09 CLASS 2 OBESITY DUE TO EXCESS CALORIES WITHOUT SERIOUS COMORBIDITY WITH BODY MASS INDEX (BMI) OF 35.0 TO 35.9 IN ADULT: ICD-10-CM

## 2023-02-08 LAB
HBA1C MFR BLD: 5.2 % (ref 0–5.6)
TSH SERPL DL<=0.005 MIU/L-ACNC: 1.74 UIU/ML (ref 0.3–4.2)

## 2023-02-08 PROCEDURE — 83036 HEMOGLOBIN GLYCOSYLATED A1C: CPT | Performed by: FAMILY MEDICINE

## 2023-02-08 PROCEDURE — 36415 COLL VENOUS BLD VENIPUNCTURE: CPT | Performed by: FAMILY MEDICINE

## 2023-02-08 PROCEDURE — 90715 TDAP VACCINE 7 YRS/> IM: CPT | Performed by: FAMILY MEDICINE

## 2023-02-08 PROCEDURE — 99214 OFFICE O/P EST MOD 30 MIN: CPT | Mod: 25 | Performed by: FAMILY MEDICINE

## 2023-02-08 PROCEDURE — 90471 IMMUNIZATION ADMIN: CPT | Performed by: FAMILY MEDICINE

## 2023-02-08 PROCEDURE — 84443 ASSAY THYROID STIM HORMONE: CPT | Performed by: FAMILY MEDICINE

## 2023-02-08 RX ORDER — SERTRALINE HYDROCHLORIDE 100 MG/1
200 TABLET, FILM COATED ORAL DAILY
Qty: 180 TABLET | Refills: 1 | Status: CANCELLED | OUTPATIENT
Start: 2023-02-08

## 2023-02-08 RX ORDER — SEMAGLUTIDE 0.25 MG/.5ML
0.25 INJECTION, SOLUTION SUBCUTANEOUS WEEKLY
Qty: 2 ML | Refills: 0 | Status: SHIPPED | OUTPATIENT
Start: 2023-02-08 | End: 2023-02-15

## 2023-02-08 RX ORDER — SERTRALINE HYDROCHLORIDE 100 MG/1
100 TABLET, FILM COATED ORAL DAILY
Qty: 90 TABLET | Refills: 1 | Status: SHIPPED | OUTPATIENT
Start: 2023-02-08 | End: 2023-07-26

## 2023-02-08 NOTE — PROGRESS NOTES
"1. Unable to lose weight  Patient is struggling in her weight loss journey - thinks it may be \"pre-menopause\" - discussed reasons for inability to lose weight   - TSH; Future  - Hemoglobin A1c; Future  - TSH  - Hemoglobin A1c    2. Class 2 obesity due to excess calories without serious comorbidity with body mass index (BMI) of 35.0 to 35.9 in adult  Discussed options for weight loss management - will try WEgovy - patient struggling - discussed that this is a medication that needs titration upwards in dosing   - Semaglutide-Weight Management (WEGOVY) 0.25 MG/0.5ML SOAJ; Inject 0.25 mg Subcutaneous once a week  Dispense: 2 mL; Refill: 0    3. Anxiety state  Continues to have significant anxiety - continue Sertraline -   - sertraline (ZOLOFT) 100 MG tablet; Take 1 tablet (100 mg) by mouth daily  Dispense: 90 tablet; Refill: 1    4. Screening for HIV (human immunodeficiency virus)  Discussed recommendation for HIV screening - low risk - not ordered      5. Need for hepatitis C screening test  Discussed recommendation for Hepatitis C screening - low risk - not ordered      6. Need for prophylactic vaccination against diphtheria and tetanus  Due for tetanus vaccine - ordered   - TDAP VACCINE (Adacel, Boostrix)      Chen Jama is a 37 year old, presenting for the following health issues:  Weight Check      Feels like she has to starve self -   Highest weight 225# -   Has surgery for hysterectomy for adenomyosis/endometriosis (in 2 months)    Struggled for 10 years with weight -   High protein/low carb - 5918-7414 raisa/day  Rows/walks 3-4 x / week  Works 3 jobs on top of that -   Sleeps well - snores a lot - ( not happy about that)    Waits until 10 am to eat  Does 18-6 fast -       History of Present Illness       Reason for visit:  Weight gain    She eats 2-3 servings of fruits and vegetables daily.She consumes 0 sweetened beverage(s) daily.She exercises with enough effort to increase her heart rate 30 to 60 " "minutes per day.  She exercises with enough effort to increase her heart rate 3 or less days per week.   She is taking medications regularly.             Review of Systems   Constitutional: Positive for unexpected weight change (unable to lose weight ). Negative for activity change (trying to increase activity/exercise), appetite change, chills and fever.   HENT: Negative.    Eyes: Negative for visual disturbance.   Respiratory: Negative for cough and shortness of breath.    Cardiovascular: Negative for chest pain and peripheral edema.   Gastrointestinal: Negative for abdominal pain.   Endocrine: Negative for polydipsia and polyuria.   Genitourinary: Positive for menstrual problem (h/o adenomyosis/endometriosis).   Musculoskeletal: Negative.    Skin: Negative.    Neurological: Negative.    Hematological: Negative.  Does not bruise/bleed easily.   Psychiatric/Behavioral: Negative.    All other systems reviewed and are negative.           Objective    /88 (BP Location: Right arm, Patient Position: Sitting, Cuff Size: Adult Large)   Pulse 61   Resp 18   Ht 1.67 m (5' 5.75\")   Wt 98 kg (216 lb)   LMP 02/01/2023   SpO2 99%   BMI 35.13 kg/m    Body mass index is 35.13 kg/m .  Physical Exam  Vitals reviewed.   Constitutional:       General: She is not in acute distress.     Appearance: Normal appearance.   HENT:      Head: Normocephalic.      Right Ear: Tympanic membrane, ear canal and external ear normal.      Left Ear: Tympanic membrane, ear canal and external ear normal.      Nose: Nose normal.      Mouth/Throat:      Mouth: Mucous membranes are moist.      Pharynx: No posterior oropharyngeal erythema.   Eyes:      Extraocular Movements: Extraocular movements intact.      Conjunctiva/sclera: Conjunctivae normal.      Pupils: Pupils are equal, round, and reactive to light.   Cardiovascular:      Rate and Rhythm: Normal rate and regular rhythm.      Pulses: Normal pulses.      Heart sounds: Normal heart " sounds. No murmur heard.  Pulmonary:      Effort: Pulmonary effort is normal.      Breath sounds: Normal breath sounds.   Abdominal:      Palpations: Abdomen is soft. There is no mass.      Tenderness: There is no abdominal tenderness. There is no guarding or rebound.   Musculoskeletal:         General: No deformity. Normal range of motion.      Cervical back: Normal range of motion and neck supple.   Lymphadenopathy:      Cervical: No cervical adenopathy.   Skin:     General: Skin is warm and dry.   Neurological:      General: No focal deficit present.      Mental Status: She is alert.   Psychiatric:         Mood and Affect: Mood normal.         Behavior: Behavior normal.            Results for orders placed or performed in visit on 02/08/23   TSH     Status: Normal   Result Value Ref Range    TSH 1.74 0.30 - 4.20 uIU/mL   Hemoglobin A1c     Status: Normal   Result Value Ref Range    Hemoglobin A1C 5.2 0.0 - 5.6 %

## 2023-02-12 ASSESSMENT — ENCOUNTER SYMPTOMS
PSYCHIATRIC NEGATIVE: 1
FEVER: 0
POLYDIPSIA: 0
HEMATOLOGIC/LYMPHATIC NEGATIVE: 1
ACTIVITY CHANGE: 0
CHILLS: 0
APPETITE CHANGE: 0
ABDOMINAL PAIN: 0
BRUISES/BLEEDS EASILY: 0
NEUROLOGICAL NEGATIVE: 1
SHORTNESS OF BREATH: 0
COUGH: 0
UNEXPECTED WEIGHT CHANGE: 1
MUSCULOSKELETAL NEGATIVE: 1

## 2023-02-15 DIAGNOSIS — E66.09 CLASS 2 OBESITY DUE TO EXCESS CALORIES WITHOUT SERIOUS COMORBIDITY WITH BODY MASS INDEX (BMI) OF 35.0 TO 35.9 IN ADULT: ICD-10-CM

## 2023-02-15 DIAGNOSIS — R63.8 UNABLE TO LOSE WEIGHT: Primary | ICD-10-CM

## 2023-02-15 DIAGNOSIS — E66.812 CLASS 2 OBESITY DUE TO EXCESS CALORIES WITHOUT SERIOUS COMORBIDITY WITH BODY MASS INDEX (BMI) OF 35.0 TO 35.9 IN ADULT: ICD-10-CM

## 2023-02-15 RX ORDER — TIRZEPATIDE 2.5 MG/.5ML
2.5 INJECTION, SOLUTION SUBCUTANEOUS
Qty: 2 ML | Refills: 0 | Status: SHIPPED | OUTPATIENT
Start: 2023-02-15 | End: 2023-02-23

## 2023-02-16 ENCOUNTER — TELEPHONE (OUTPATIENT)
Dept: FAMILY MEDICINE | Facility: CLINIC | Age: 38
End: 2023-02-16

## 2023-02-16 NOTE — TELEPHONE ENCOUNTER
PRIOR AUTHORIZATION DENIED    Medication: tirzepatide (MOUNJARO) 2.5 MG/0.5ML pen - EPA DENIED    Denial Date: 2/16/2023    Denial Rational:      Appeal Information:

## 2023-02-22 ENCOUNTER — TELEPHONE (OUTPATIENT)
Dept: FAMILY MEDICINE | Facility: CLINIC | Age: 38
End: 2023-02-22
Payer: COMMERCIAL

## 2023-02-22 NOTE — TELEPHONE ENCOUNTER
Medication Question or Refill        What medication are you calling about (include dose and sig)?:   Semaglutide-Weight Management (WEGOVY) 0.25 MG/0.5ML SOAJ (Discontinued) 2 mL 0 2/8/2023 2/15/2023 --   Sig - Route: Inject 0.25 mg Subcutaneous once a week - Subcutaneous         Preferred Pharmacy:   [id number] [address] & [address] [address]  Phone: [phone] Fax: [phone]      Controlled Substance Agreement on file:   CSA -- Patient Level:    CSA: None found at the patient level.       Who prescribed the medication?:      Do you need a refill? Yes    When did you use the medication last? Unknown     Patient offered an appointment? No    Do you have any questions or concerns?  Yes:  Pt requesting if provider can order Wegovy for pt and discontinue Mounjaro. Pt stated that she has voucher to get Wegovy and would preferred it. Please look into request an order if med if applicable. Thank you.        Could we send this information to you in [employer] or would you prefer to receive a phone call?:   Patient would prefer a phone call   Okay to leave a detailed message?: Yes at Home number on file [phone] (home)

## 2023-02-22 NOTE — TELEPHONE ENCOUNTER
There is a mychart regarding this medication that has been sent to Dr. Arnold, closing this encounter.

## 2023-02-23 DIAGNOSIS — E66.812 CLASS 2 OBESITY DUE TO EXCESS CALORIES WITHOUT SERIOUS COMORBIDITY WITH BODY MASS INDEX (BMI) OF 35.0 TO 35.9 IN ADULT: Primary | ICD-10-CM

## 2023-02-23 DIAGNOSIS — E66.09 CLASS 2 OBESITY DUE TO EXCESS CALORIES WITHOUT SERIOUS COMORBIDITY WITH BODY MASS INDEX (BMI) OF 35.0 TO 35.9 IN ADULT: Primary | ICD-10-CM

## 2023-02-23 RX ORDER — SEMAGLUTIDE 0.25 MG/.5ML
0.25 INJECTION, SOLUTION SUBCUTANEOUS WEEKLY
Qty: 2 ML | Refills: 0 | Status: SHIPPED | OUTPATIENT
Start: 2023-02-23 | End: 2023-06-21

## 2023-04-03 ENCOUNTER — OFFICE VISIT (OUTPATIENT)
Dept: FAMILY MEDICINE | Facility: CLINIC | Age: 38
End: 2023-04-03
Payer: COMMERCIAL

## 2023-04-03 VITALS
HEART RATE: 63 BPM | BODY MASS INDEX: 35.64 KG/M2 | TEMPERATURE: 97.4 F | HEIGHT: 66 IN | WEIGHT: 221.75 LBS | OXYGEN SATURATION: 100 % | RESPIRATION RATE: 24 BRPM | DIASTOLIC BLOOD PRESSURE: 82 MMHG | SYSTOLIC BLOOD PRESSURE: 152 MMHG

## 2023-04-03 DIAGNOSIS — Z01.818 PREOP EXAMINATION: Primary | ICD-10-CM

## 2023-04-03 DIAGNOSIS — N80.9 ENDOMETRIOSIS: ICD-10-CM

## 2023-04-03 DIAGNOSIS — F41.1 ANXIETY STATE: ICD-10-CM

## 2023-04-03 DIAGNOSIS — I10 PRIMARY HYPERTENSION: ICD-10-CM

## 2023-04-03 DIAGNOSIS — E66.812 CLASS 2 OBESITY DUE TO EXCESS CALORIES WITHOUT SERIOUS COMORBIDITY WITH BODY MASS INDEX (BMI) OF 36.0 TO 36.9 IN ADULT: ICD-10-CM

## 2023-04-03 DIAGNOSIS — E66.09 CLASS 2 OBESITY DUE TO EXCESS CALORIES WITHOUT SERIOUS COMORBIDITY WITH BODY MASS INDEX (BMI) OF 36.0 TO 36.9 IN ADULT: ICD-10-CM

## 2023-04-03 LAB
ANION GAP SERPL CALCULATED.3IONS-SCNC: 9 MMOL/L (ref 7–15)
ATRIAL RATE - MUSE: 53 BPM
BUN SERPL-MCNC: 11.4 MG/DL (ref 6–20)
CALCIUM SERPL-MCNC: 9.5 MG/DL (ref 8.6–10)
CHLORIDE SERPL-SCNC: 105 MMOL/L (ref 98–107)
CREAT SERPL-MCNC: 0.76 MG/DL (ref 0.51–0.95)
DEPRECATED HCO3 PLAS-SCNC: 25 MMOL/L (ref 22–29)
DIASTOLIC BLOOD PRESSURE - MUSE: NORMAL MMHG
ERYTHROCYTE [DISTWIDTH] IN BLOOD BY AUTOMATED COUNT: 13 % (ref 10–15)
GFR SERPL CREATININE-BSD FRML MDRD: >90 ML/MIN/1.73M2
GLUCOSE SERPL-MCNC: 90 MG/DL (ref 70–99)
HCG INTACT+B SERPL-ACNC: <1 MIU/ML
HCT VFR BLD AUTO: 37.8 % (ref 35–47)
HGB BLD-MCNC: 12.2 G/DL (ref 11.7–15.7)
INTERPRETATION ECG - MUSE: NORMAL
MCH RBC QN AUTO: 31.8 PG (ref 26.5–33)
MCHC RBC AUTO-ENTMCNC: 32.3 G/DL (ref 31.5–36.5)
MCV RBC AUTO: 98 FL (ref 78–100)
P AXIS - MUSE: 23 DEGREES
PLATELET # BLD AUTO: 357 10E3/UL (ref 150–450)
POTASSIUM SERPL-SCNC: 4.5 MMOL/L (ref 3.4–5.3)
PR INTERVAL - MUSE: 134 MS
QRS DURATION - MUSE: 84 MS
QT - MUSE: 450 MS
QTC - MUSE: 422 MS
R AXIS - MUSE: 28 DEGREES
RBC # BLD AUTO: 3.84 10E6/UL (ref 3.8–5.2)
SODIUM SERPL-SCNC: 139 MMOL/L (ref 136–145)
SYSTOLIC BLOOD PRESSURE - MUSE: NORMAL MMHG
T AXIS - MUSE: 7 DEGREES
VENTRICULAR RATE- MUSE: 53 BPM
WBC # BLD AUTO: 5.5 10E3/UL (ref 4–11)

## 2023-04-03 PROCEDURE — 93010 ELECTROCARDIOGRAM REPORT: CPT | Performed by: INTERNAL MEDICINE

## 2023-04-03 PROCEDURE — 80048 BASIC METABOLIC PNL TOTAL CA: CPT | Performed by: FAMILY MEDICINE

## 2023-04-03 PROCEDURE — 36415 COLL VENOUS BLD VENIPUNCTURE: CPT | Performed by: FAMILY MEDICINE

## 2023-04-03 PROCEDURE — 99214 OFFICE O/P EST MOD 30 MIN: CPT | Performed by: FAMILY MEDICINE

## 2023-04-03 PROCEDURE — 84702 CHORIONIC GONADOTROPIN TEST: CPT | Performed by: FAMILY MEDICINE

## 2023-04-03 PROCEDURE — 85027 COMPLETE CBC AUTOMATED: CPT | Performed by: FAMILY MEDICINE

## 2023-04-03 PROCEDURE — 93005 ELECTROCARDIOGRAM TRACING: CPT | Performed by: FAMILY MEDICINE

## 2023-04-03 ASSESSMENT — ENCOUNTER SYMPTOMS
HEADACHES: 1
ALLERGIC/IMMUNOLOGIC NEGATIVE: 1
POLYDIPSIA: 0
PSYCHIATRIC NEGATIVE: 1
CHILLS: 0
BRUISES/BLEEDS EASILY: 0
TREMORS: 0
ABDOMINAL PAIN: 1
DIZZINESS: 0
COUGH: 0
SORE THROAT: 0
HEMATOCHEZIA: 0
FEVER: 0
MUSCULOSKELETAL NEGATIVE: 1
DIARRHEA: 0
SHORTNESS OF BREATH: 0
CONSTIPATION: 0

## 2023-04-03 NOTE — PROGRESS NOTES
[facility] [address] [place] [address]  Phone: [phone]  Fax: [phone]  Primary Provider: Christina Orta  Pre-op Performing Provider: CHRISTINA ORTA      PREOPERATIVE EVALUATION:  Today's date: 4/3/2023    Evita Fiore is a 37 year old female who presents for a preoperative evaluation.      4/3/2023     6:53 AM   Additional Questions   Roomed by Rachel     Surgical Information:  Surgery/Procedure: ROBOTIC ASSISTED  HYSTERECTOMY, bilateral salpingectomies,possible excision of endometriosis, possible appendectomy, cystoscopy.   Urology to place ureteral catheters at beginning of case.  Surgery Location: [facility]  Surgeon: Onel Bergman MD  Surgery Date: 4/6/23  Time of Surgery: 7:30am  Where patient plans to recover: At home with family  Fax number for surgical facility: [phone]    Assessment & Plan     The proposed surgical procedure is considered INTERMEDIATE risk.    Problem List Items Addressed This Visit        Digestive    Obesity       Circulatory    Hypertension    Relevant Orders    EKG 12-lead, tracing only (Completed)       Other    Endometriosis    Anxiety state   Other Visit Diagnoses     Preop examination    -  Primary    Relevant Orders    CBC with platelets    EKG 12-lead, tracing only (Completed)    Basic metabolic panel  (Ca, Cl, CO2, Creat, Gluc, K, Na, BUN)    HCG quantitative pregnancy                 Risks and Recommendations:  The patient has the following additional risks and recommendations for perioperative complications:   - No identified additional risk factors other than previously addressed    Medication Instructions:  Patient takes no morning medications.     RECOMMENDATION:  APPROVAL GIVEN to proceed with proposed procedure, without further diagnostic evaluation.    Review of prior external note(s) from [facility] information from [facility]   reviewed        Subjective     HPI related to upcoming procedure: Patient has had longstanding pelvic pain related to endometriosis - has had 2 previous exploratory lap; now scheduled for hysterectomy with salpingectomy , excision of endometriosis and possible appendectomy, cystoscopy -         4/3/2023     6:51 AM   Preop Questions   1. Have you ever had a heart attack or stroke? No   2. Have you ever had surgery on your heart or blood vessels, such as a stent placement, a coronary artery bypass, or surgery on an artery in your head, neck, heart, or legs? No   3. Do you have chest pain with activity? No   4. Do you have a history of  heart failure? No   5. Do you currently have a cold, bronchitis or symptoms of other infection? No   6. Do you have a cough, shortness of breath, or wheezing? No   7. Do you or anyone in your family have previous history of blood clots? No   8. Do you or does anyone in your family have a serious bleeding problem such as prolonged bleeding following surgeries or cuts? No   9. Have you ever had problems with anemia or been told to take iron pills? No   10. Have you had any abnormal blood loss such as black, tarry or bloody stools, or abnormal vaginal bleeding? No   11. Have you ever had a blood transfusion? No   12. Are you willing to have a blood transfusion if it is medically needed before, during, or after your surgery? Yes   13. Have you or any of your relatives ever had problems with anesthesia? No   14. Do you have sleep apnea, excessive snoring or daytime drowsiness? No   15. Do you have any artifical heart valves or other implanted medical devices like a pacemaker, defibrillator, or continuous glucose monitor? No   16. Do you have artificial joints? No   17. Are you allergic to latex? No   18. Is there any chance that you may be pregnant? No       Health Care Directive:  Patient does not have a Health Care Directive or Living Will: no written documents; Full code;  "    Preoperative Review of :   reviewed - no record of controlled substances prescribed.      Status of Chronic Conditions:  See problem list for active medical problems.  Problems all longstanding and stable, except as noted/documented.  See ROS for pertinent symptoms related to these conditions.      Review of Systems   Constitutional: Negative for chills and fever.   HENT: Negative for congestion and sore throat.    Eyes: Negative for visual disturbance.   Respiratory: Negative for cough and shortness of breath.    Cardiovascular: Negative for chest pain and peripheral edema.   Gastrointestinal: Positive for abdominal pain (related to her endometriosis - \"period pain\"). Negative for constipation, diarrhea and hematochezia.   Endocrine: Negative for polydipsia and polyuria.   Breasts:  negative.    Genitourinary: Positive for pelvic pain.   Musculoskeletal: Negative.    Skin: Negative.    Allergic/Immunologic: Negative.    Neurological: Positive for headaches. Negative for dizziness and tremors.   Hematological: Does not bruise/bleed easily.   Psychiatric/Behavioral: Negative.    All other systems reviewed and are negative.    Had \"allergic reaction\" to tetanus shot (this year) - it was \"huge and red and hot\" x 1 week -     Patient Active Problem List    Diagnosis Date Noted     Endometriosis 08/03/2021     Priority: Medium     \"Stage IV\"       Rash and other nonspecific skin eruption 08/03/2021     Priority: Medium     Formatting of this note might be different from the original.  Created by Conversion       Attention deficit hyperactivity disorder (ADHD) 04/22/2021     Priority: Medium     Formatting of this note might be different from the original.  Created by Conversion  Formatting of this note might be different from the original.  Formatting of this note might be different from the original.  Created by Conversion       Anxiety state 01/14/2020     Priority: Medium     Formatting of this note might be " different from the original.  Created by Conversion    Replacement Utility updated for latest IMO load       Thyroid nodule 01/14/2020     Priority: Medium     Benign per ultrasound       Obesity 01/14/2020     Priority: Medium     Hypertension 03/04/2015     Priority: Medium      No past medical history on file.  Past Surgical History:   Procedure Laterality Date     CYSTOSCOPY N/A 9/5/2018    Procedure: CYSTOSCOPY;;  Surgeon: Onel Bergman MD;  Location:  OR     DAVINCI ASSISTED ABLATION / EXCISION OF ENDOMETRIOSIS N/A 9/5/2018    Procedure: DAVINCI ASSISTED ABLATION / EXCISION OF ENDOMETRIOSIS;  DAVINCI LAPAROSCOPY, EXCISION OF ENDOMETRIOSIS, BILATERAL URETEROLYSIS, HYSTEROSCOPY, DILATION AND CURETTAGE, CYSTOSCOPY;  Surgeon: Onel Bergman MD;  Location: [facility] OR     DILATION AND CURETTAGE, HYSTEROSCOPY DIAGNOSTIC, COMBINED N/A 9/5/2018    Procedure: COMBINED DILATION AND CURETTAGE, HYSTEROSCOPY DIAGNOSTIC;;  Surgeon: Onel Bergman MD;  Location:  OR     ENT SURGERY      oral     GYN SURGERY      hysteroscopy, d and c     Current Outpatient Medications   Medication Sig Dispense Refill     sertraline (ZOLOFT) 100 MG tablet Take 1 tablet (100 mg) by mouth daily 90 tablet 1     Semaglutide-Weight Management (WEGOVY) 0.25 MG/0.5ML SOAJ Inject 0.25 mg Subcutaneous once a week 2 mL 0       No Known Allergies     Social History     Tobacco Use     Smoking status: Former     Packs/day: 1.00     Types: Cigarettes     Smokeless tobacco: Never     Tobacco comments:     one pack every 3 days   Vaping Use     Vaping status: Never Used   Substance Use Topics     Alcohol use: Yes     Comment: once per month-4 drinks     No family history on file.  History   Drug Use No         Objective     LMP 02/01/2023     Physical Exam  Vitals reviewed.   Constitutional:       General: She is not in acute distress.     Appearance: Normal appearance.   HENT:      Head: Normocephalic.      Right Ear: Tympanic  membrane, ear canal and external ear normal.      Left Ear: Tympanic membrane, ear canal and external ear normal.      Nose: Nose normal.      Mouth/Throat:      Mouth: Mucous membranes are moist.      Pharynx: No posterior oropharyngeal erythema.   Eyes:      Extraocular Movements: Extraocular movements intact.      Conjunctiva/sclera: Conjunctivae normal.      Pupils: Pupils are equal, round, and reactive to light.   Cardiovascular:      Rate and Rhythm: Normal rate and regular rhythm.      Pulses: Normal pulses.      Heart sounds: Normal heart sounds. No murmur heard.  Pulmonary:      Effort: Pulmonary effort is normal.      Breath sounds: Normal breath sounds.   Abdominal:      Palpations: Abdomen is soft. There is no mass.      Tenderness: There is no abdominal tenderness. There is no guarding or rebound.   Musculoskeletal:         General: No deformity. Normal range of motion.      Cervical back: Normal range of motion and neck supple.   Lymphadenopathy:      Cervical: No cervical adenopathy.   Skin:     General: Skin is warm and dry.   Neurological:      General: No focal deficit present.      Mental Status: She is alert.   Psychiatric:         Mood and Affect: Mood normal.         Behavior: Behavior normal.           Recent Labs   Lab Test 02/08/23  0940   A1C 5.2        Diagnostics:  Results for orders placed or performed in visit on 04/03/23   CBC with platelets     Status: Normal   Result Value Ref Range    WBC Count 5.5 4.0 - 11.0 10e3/uL    RBC Count 3.84 3.80 - 5.20 10e6/uL    Hemoglobin 12.2 11.7 - 15.7 g/dL    Hematocrit 37.8 35.0 - 47.0 %    MCV 98 78 - 100 fL    MCH 31.8 26.5 - 33.0 pg    MCHC 32.3 31.5 - 36.5 g/dL    RDW 13.0 10.0 - 15.0 %    Platelet Count 357 150 - 450 10e3/uL   Basic metabolic panel  (Ca, Cl, CO2, Creat, Gluc, K, Na, BUN)     Status: Normal   Result Value Ref Range    Sodium 139 136 - 145 mmol/L    Potassium 4.5 3.4 - 5.3 mmol/L    Chloride 105 98 - 107 mmol/L    Carbon Dioxide  (CO2) 25 22 - 29 mmol/L    Anion Gap 9 7 - 15 mmol/L    Urea Nitrogen 11.4 6.0 - 20.0 mg/dL    Creatinine 0.76 0.51 - 0.95 mg/dL    Calcium 9.5 8.6 - 10.0 mg/dL    Glucose 90 70 - 99 mg/dL    GFR Estimate >90 >60 mL/min/1.73m2   HCG quantitative pregnancy     Status: Normal   Result Value Ref Range    hCG Quantitative <1 <5 mIU/mL   EKG 12-lead, tracing only     Status: None   Result Value Ref Range    Systolic Blood Pressure  mmHg    Diastolic Blood Pressure  mmHg    Ventricular Rate 53 BPM    Atrial Rate 53 BPM    MA Interval 134 ms    QRS Duration 84 ms     ms    QTc 422 ms    P Axis 23 degrees    R AXIS 28 degrees    T Axis 7 degrees    Interpretation ECG       Sinus bradycardia  Otherwise normal ECG  No previous ECGs available  Confirmed by TOÑO KIDD MD LOC:[facility] ([id number]) on 4/3/2023 12:41:06 PM               Revised Cardiac Risk Index (RCRI):  The patient has the following serious cardiovascular risks for perioperative complications:   - No serious cardiac risks = 0 points     RCRI Interpretation: 1 point: Class II (low risk - 0.9% complication rate)           Signed Electronically by: WILLIAN NEAL MD  Copy of this evaluation report is provided to requesting physician.

## 2023-04-05 ENCOUNTER — TELEPHONE (OUTPATIENT)
Dept: FAMILY MEDICINE | Facility: CLINIC | Age: 38
End: 2023-04-05
Payer: COMMERCIAL

## 2023-04-05 NOTE — TELEPHONE ENCOUNTER
Surgery nurse called back, stated they need before 2pm or they'll have to reschedule pt. Thank you.

## 2023-04-05 NOTE — TELEPHONE ENCOUNTER
General Call      Reason for Call:  [facility] requesting for Pre-op notes    What are your questions or concerns:  Marry from [facility] called states that pt will be having her surgery tomorrow at 7:30am. Marry is requesting if provider can please sign 4/3/23 Pre-op notes and get it over to them at fax#[phone] ASAP today.      Date of last appointment with provider: 4/3/23

## 2023-06-03 ENCOUNTER — HEALTH MAINTENANCE LETTER (OUTPATIENT)
Age: 38
End: 2023-06-03

## 2023-06-13 DIAGNOSIS — E66.09 CLASS 2 OBESITY DUE TO EXCESS CALORIES WITHOUT SERIOUS COMORBIDITY WITH BODY MASS INDEX (BMI) OF 36.0 TO 36.9 IN ADULT: Primary | ICD-10-CM

## 2023-06-13 DIAGNOSIS — E66.812 CLASS 2 OBESITY DUE TO EXCESS CALORIES WITHOUT SERIOUS COMORBIDITY WITH BODY MASS INDEX (BMI) OF 36.0 TO 36.9 IN ADULT: Primary | ICD-10-CM

## 2023-06-14 ENCOUNTER — TELEPHONE (OUTPATIENT)
Dept: FAMILY MEDICINE | Facility: CLINIC | Age: 38
End: 2023-06-14
Payer: COMMERCIAL

## 2023-06-14 NOTE — TELEPHONE ENCOUNTER
Central Prior Authorization Team   Phone: [phone]      PRIOR AUTHORIZATION DENIED    Medication: OZEMPIC (0.25 OR 0.5 MG/DOSE) 2 MG/3ML SC SOPN  Insurance Company: [employer] ([facility]) - Phone [phone] Fax [phone]  Denial Date: 6/14/2023  Denial Rational: MEDICATION IS FDA APPROVED FOR PATIENT'S WITH TYPE II DM ONLY.      Appeal Information:       Patient Notified: No

## 2023-06-15 NOTE — TELEPHONE ENCOUNTER
Patient returned call, she confirmed with her insurance that Mounjaro is NOT covered by her insurance.     She is now requesting either Wegovy or Saxenda as both are covered by her insurance.

## 2023-06-15 NOTE — TELEPHONE ENCOUNTER
Provider response below relayed to patient. Message understood. Pt is wondering if you can send in mounjaro instead?

## 2023-06-21 DIAGNOSIS — E66.812 CLASS 2 OBESITY DUE TO EXCESS CALORIES WITHOUT SERIOUS COMORBIDITY WITH BODY MASS INDEX (BMI) OF 35.0 TO 35.9 IN ADULT: Primary | ICD-10-CM

## 2023-06-21 DIAGNOSIS — E66.09 CLASS 2 OBESITY DUE TO EXCESS CALORIES WITHOUT SERIOUS COMORBIDITY WITH BODY MASS INDEX (BMI) OF 35.0 TO 35.9 IN ADULT: Primary | ICD-10-CM

## 2023-06-22 ENCOUNTER — TELEPHONE (OUTPATIENT)
Dept: FAMILY MEDICINE | Facility: CLINIC | Age: 38
End: 2023-06-22
Payer: COMMERCIAL

## 2023-06-22 DIAGNOSIS — E66.09 CLASS 2 OBESITY DUE TO EXCESS CALORIES WITHOUT SERIOUS COMORBIDITY WITH BODY MASS INDEX (BMI) OF 35.0 TO 35.9 IN ADULT: ICD-10-CM

## 2023-06-22 DIAGNOSIS — E66.812 CLASS 2 OBESITY DUE TO EXCESS CALORIES WITHOUT SERIOUS COMORBIDITY WITH BODY MASS INDEX (BMI) OF 35.0 TO 35.9 IN ADULT: ICD-10-CM

## 2023-06-26 NOTE — TELEPHONE ENCOUNTER
I have received a message from the pharmacy saying the patient is still waiting on this and was sent PA forms.

## 2023-06-29 NOTE — TELEPHONE ENCOUNTER
Initial pharmacy ([facility]) state this rx is closed/inactive at their pharmacy as they do not have this in stock. They state rx was transferred to another [facility] ([place]). Called that location and they state patient already picked up her Saxenda on 6/24/23. No further action needed.

## 2023-06-29 NOTE — TELEPHONE ENCOUNTER
This message is confusing - it says the prescription is approved through 10/2023, but then asks for preapproval.  Not sure what action I need to take?

## 2023-07-26 DIAGNOSIS — F41.1 ANXIETY STATE: ICD-10-CM

## 2023-07-26 RX ORDER — SERTRALINE HYDROCHLORIDE 100 MG/1
TABLET, FILM COATED ORAL
Qty: 90 TABLET | Refills: 2 | Status: SHIPPED | OUTPATIENT
Start: 2023-07-26 | End: 2024-03-04

## 2023-07-27 NOTE — TELEPHONE ENCOUNTER
"Last Written Prescription Date:  2/8/23  Last Fill Quantity: 90,  # refills: 1   Last office visit provider:  4/3/23     Requested Prescriptions   Pending Prescriptions Disp Refills    sertraline (ZOLOFT) 100 MG tablet [Pharmacy Med Name: SERTRALINE 100MG TABLETS] 90 tablet 1     Sig: TAKE 1 TABLET(100 MG) BY MOUTH DAILY       SSRIs Protocol Passed - 7/26/2023 12:12 PM        Passed - Recent (12 mo) or future (30 days) visit within the authorizing provider's specialty     Patient has had an office visit with the authorizing provider or a provider within the authorizing providers department within the previous 12 mos or has a future within next 30 days. See \"Patient Info\" tab in inbasket, or \"Choose Columns\" in Meds & Orders section of the refill encounter.              Passed - Medication is active on med list        Passed - Patient is age 18 or older        Passed - No active pregnancy on record        Passed - No positive pregnancy test in last 12 months             Abby Li RN 07/26/23 11:10 PM  "

## 2023-08-24 DIAGNOSIS — E66.812 CLASS 2 OBESITY DUE TO EXCESS CALORIES WITHOUT SERIOUS COMORBIDITY WITH BODY MASS INDEX (BMI) OF 35.0 TO 35.9 IN ADULT: Primary | ICD-10-CM

## 2023-08-24 DIAGNOSIS — E66.09 CLASS 2 OBESITY DUE TO EXCESS CALORIES WITHOUT SERIOUS COMORBIDITY WITH BODY MASS INDEX (BMI) OF 35.0 TO 35.9 IN ADULT: Primary | ICD-10-CM

## 2023-10-16 DIAGNOSIS — E66.09 CLASS 2 OBESITY DUE TO EXCESS CALORIES WITHOUT SERIOUS COMORBIDITY WITH BODY MASS INDEX (BMI) OF 35.0 TO 35.9 IN ADULT: ICD-10-CM

## 2023-10-16 DIAGNOSIS — E66.812 CLASS 2 OBESITY DUE TO EXCESS CALORIES WITHOUT SERIOUS COMORBIDITY WITH BODY MASS INDEX (BMI) OF 35.0 TO 35.9 IN ADULT: ICD-10-CM

## 2023-12-10 DIAGNOSIS — E66.812 CLASS 2 OBESITY DUE TO EXCESS CALORIES WITHOUT SERIOUS COMORBIDITY WITH BODY MASS INDEX (BMI) OF 35.0 TO 35.9 IN ADULT: ICD-10-CM

## 2023-12-10 DIAGNOSIS — E66.09 CLASS 2 OBESITY DUE TO EXCESS CALORIES WITHOUT SERIOUS COMORBIDITY WITH BODY MASS INDEX (BMI) OF 35.0 TO 35.9 IN ADULT: ICD-10-CM

## 2023-12-10 RX ORDER — SEMAGLUTIDE 2.4 MG/.75ML
INJECTION, SOLUTION SUBCUTANEOUS
Qty: 3 ML | Refills: 1 | Status: SHIPPED | OUTPATIENT
Start: 2023-12-10 | End: 2024-03-13

## 2024-01-15 ENCOUNTER — TELEPHONE (OUTPATIENT)
Dept: FAMILY MEDICINE | Facility: CLINIC | Age: 39
End: 2024-01-15
Payer: COMMERCIAL

## 2024-01-24 DIAGNOSIS — E66.812 CLASS 2 SEVERE OBESITY DUE TO EXCESS CALORIES WITH SERIOUS COMORBIDITY AND BODY MASS INDEX (BMI) OF 36.0 TO 36.9 IN ADULT (H): ICD-10-CM

## 2024-01-24 DIAGNOSIS — E66.01 CLASS 2 SEVERE OBESITY DUE TO EXCESS CALORIES WITH SERIOUS COMORBIDITY AND BODY MASS INDEX (BMI) OF 36.0 TO 36.9 IN ADULT (H): ICD-10-CM

## 2024-01-24 DIAGNOSIS — R63.8 UNABLE TO LOSE WEIGHT: Primary | ICD-10-CM

## 2024-01-24 DIAGNOSIS — E28.2 PCOS (POLYCYSTIC OVARIAN SYNDROME): ICD-10-CM

## 2024-01-24 NOTE — TELEPHONE ENCOUNTER
PRIOR AUTHORIZATION DENIED    Medication: WEGOVY 2.4 MG/0.75ML SC SOAJ  Insurance Company: [employer] - Phone [phone] Fax [phone]  Denial Date: 1/24/2024  Denial Reason(s): Medication is Excluded from coverage      Appeal Information: N/a  Patient Notified: No

## 2024-01-25 ENCOUNTER — TELEPHONE (OUTPATIENT)
Dept: FAMILY MEDICINE | Facility: CLINIC | Age: 39
End: 2024-01-25
Payer: COMMERCIAL

## 2024-01-25 NOTE — TELEPHONE ENCOUNTER
Retail Pharmacy Prior Authorization Team   Phone: [phone]    PRIOR AUTHORIZATION DENIED    Medication: OZEMPIC (0.25 OR 0.5 MG/DOSE) 2 MG/3ML SC SOPN  Insurance Company: [employer]Specialty [employer]'s - Phone [phone] Fax [phone]  Denial Date: 1/25/2024  Denial Reason(s): REQUIRES DX OF TYPE 2 DIABETES      Appeal Information: IF PROVIDER WOULD LIKE TO APPEAL THIS DECISION PLEASE PROVIDE THE PA TEAM WITH A LETTER OF MEDICAL NECESSITY      Patient Notified: No

## 2024-01-29 NOTE — CONFIDENTIAL NOTE
Unable to LVM for the pt, VM was full. When the pt calls back please relay the below information #1

## 2024-01-30 NOTE — TELEPHONE ENCOUNTER
Unable to leave message x 2. Please review message thread below and advise the patient as indicated. Please schedule if necessary or indicated in message thread.

## 2024-02-26 ENCOUNTER — MYC REFILL (OUTPATIENT)
Dept: FAMILY MEDICINE | Facility: CLINIC | Age: 39
End: 2024-02-26
Payer: COMMERCIAL

## 2024-02-26 DIAGNOSIS — F41.1 ANXIETY STATE: ICD-10-CM

## 2024-02-27 ENCOUNTER — MYC MEDICAL ADVICE (OUTPATIENT)
Dept: FAMILY MEDICINE | Facility: CLINIC | Age: 39
End: 2024-02-27
Payer: COMMERCIAL

## 2024-02-27 DIAGNOSIS — E28.2 PCOS (POLYCYSTIC OVARIAN SYNDROME): ICD-10-CM

## 2024-02-27 DIAGNOSIS — R63.8 UNABLE TO LOSE WEIGHT: Primary | ICD-10-CM

## 2024-02-27 DIAGNOSIS — E66.01 CLASS 2 SEVERE OBESITY DUE TO EXCESS CALORIES WITH SERIOUS COMORBIDITY AND BODY MASS INDEX (BMI) OF 36.0 TO 36.9 IN ADULT (H): ICD-10-CM

## 2024-02-27 DIAGNOSIS — E66.812 CLASS 2 SEVERE OBESITY DUE TO EXCESS CALORIES WITH SERIOUS COMORBIDITY AND BODY MASS INDEX (BMI) OF 36.0 TO 36.9 IN ADULT (H): ICD-10-CM

## 2024-02-28 RX ORDER — SERTRALINE HYDROCHLORIDE 100 MG/1
100 TABLET, FILM COATED ORAL DAILY
Qty: 90 TABLET | Refills: 2 | OUTPATIENT
Start: 2024-02-28

## 2024-02-29 DIAGNOSIS — F41.1 ANXIETY STATE: ICD-10-CM

## 2024-02-29 NOTE — TELEPHONE ENCOUNTER
Medication Question or Refill    Contacts         Type Contact Phone/Fax    02/29/2024 05:48 PM CST Fax (Incoming) [facility] [address] [place] [address] (Pharmacy) [phone]            What medication are you calling about (include dose and sig)?: sertraline (ZOLOFT) 100 MG tablet     Preferred Pharmacy    [facility] [address] [place] [id number]  Phone: [phone] Fax: [phone]      Controlled Substance Agreement on file:   CSA -- Patient Level:    CSA: None found at the patient level.       Who prescribed the medication?: Dr. Catalina García    Do you need a refill? Yes    When did you use the medication last? NA    Patient offered an appointment? No    Do you have any questions or concerns?  No      Could we send this information to you in [facility] or would you prefer to receive a phone call?:   Patient would prefer a phone call   Okay to leave a detailed message?: Yes at Home number on file [phone] (home)

## 2024-02-29 NOTE — TELEPHONE ENCOUNTER
Can someone figure out how to set up this prescription ?  I didn't find compounded semaglutide in the list.  Thanks!

## 2024-02-29 NOTE — TELEPHONE ENCOUNTER
Contacted  [facility]([address], [facility]) @ [phone] and verified pended order for Dr Arnold to cosign.    Message routed to Dr Arnold for cosign.    Bernice Weinstein RN  [facility]      Trinity ARDON [facility] Family Medicine/Ob Support Pool (supporting Christina Orta MD)     Hi Dr Arnold  I spoke with the pharmacist at [facility]([address], [facility]) and was told I can get the compounded version of wegovy(semaglutide) directly through them. You would just need to send the prescription over and the price is considerably cheaper than the name brand, which is good because I'll be paying out of pocket. She said the prescription must say compounded semaglutide, and not to mention the actual name brand.. 1.25mg or 1.5mg to restart since I've been off for only 2 weeks.     Thanks, and see you in a few weeks for my annual!     -trinity

## 2024-03-01 RX ORDER — SERTRALINE HYDROCHLORIDE 100 MG/1
100 TABLET, FILM COATED ORAL DAILY
Qty: 90 TABLET | Refills: 2 | OUTPATIENT
Start: 2024-03-01

## 2024-03-04 RX ORDER — SERTRALINE HYDROCHLORIDE 100 MG/1
100 TABLET, FILM COATED ORAL DAILY
Qty: 90 TABLET | Refills: 2 | Status: SHIPPED | OUTPATIENT
Start: 2024-03-04 | End: 2025-03-04

## 2024-03-13 ENCOUNTER — OFFICE VISIT (OUTPATIENT)
Dept: FAMILY MEDICINE | Facility: CLINIC | Age: 39
End: 2024-03-13
Payer: COMMERCIAL

## 2024-03-13 VITALS
TEMPERATURE: 97.7 F | DIASTOLIC BLOOD PRESSURE: 98 MMHG | HEART RATE: 71 BPM | BODY MASS INDEX: 32.32 KG/M2 | OXYGEN SATURATION: 99 % | SYSTOLIC BLOOD PRESSURE: 146 MMHG | RESPIRATION RATE: 18 BRPM | HEIGHT: 65 IN | WEIGHT: 194 LBS

## 2024-03-13 DIAGNOSIS — I10 PRIMARY HYPERTENSION: ICD-10-CM

## 2024-03-13 DIAGNOSIS — N80.9 ENDOMETRIOSIS: ICD-10-CM

## 2024-03-13 DIAGNOSIS — Z11.4 SCREENING FOR HIV (HUMAN IMMUNODEFICIENCY VIRUS): ICD-10-CM

## 2024-03-13 DIAGNOSIS — Z00.00 ADULT GENERAL MEDICAL EXAM: Primary | ICD-10-CM

## 2024-03-13 DIAGNOSIS — Z02.89 ENCOUNTER FOR COMPLETION OF FORM WITH PATIENT: ICD-10-CM

## 2024-03-13 DIAGNOSIS — Z11.59 NEED FOR HEPATITIS C SCREENING TEST: ICD-10-CM

## 2024-03-13 DIAGNOSIS — E66.01 CLASS 2 SEVERE OBESITY DUE TO EXCESS CALORIES WITH SERIOUS COMORBIDITY AND BODY MASS INDEX (BMI) OF 36.0 TO 36.9 IN ADULT (H): ICD-10-CM

## 2024-03-13 DIAGNOSIS — E66.812 CLASS 2 SEVERE OBESITY DUE TO EXCESS CALORIES WITH SERIOUS COMORBIDITY AND BODY MASS INDEX (BMI) OF 36.0 TO 36.9 IN ADULT (H): ICD-10-CM

## 2024-03-13 DIAGNOSIS — E28.2 PCOS (POLYCYSTIC OVARIAN SYNDROME): ICD-10-CM

## 2024-03-13 DIAGNOSIS — Z90.710 S/P HYSTERECTOMY: ICD-10-CM

## 2024-03-13 LAB
ERYTHROCYTE [DISTWIDTH] IN BLOOD BY AUTOMATED COUNT: 12.6 % (ref 10–15)
HBA1C MFR BLD: 5 % (ref 0–5.6)
HCT VFR BLD AUTO: 39.2 % (ref 35–47)
HGB BLD-MCNC: 12.9 G/DL (ref 11.7–15.7)
MCH RBC QN AUTO: 30.7 PG (ref 26.5–33)
MCHC RBC AUTO-ENTMCNC: 32.9 G/DL (ref 31.5–36.5)
MCV RBC AUTO: 93 FL (ref 78–100)
PLATELET # BLD AUTO: 314 10E3/UL (ref 150–450)
RBC # BLD AUTO: 4.2 10E6/UL (ref 3.8–5.2)
WBC # BLD AUTO: 6.8 10E3/UL (ref 4–11)

## 2024-03-13 PROCEDURE — 86803 HEPATITIS C AB TEST: CPT | Performed by: FAMILY MEDICINE

## 2024-03-13 PROCEDURE — 99395 PREV VISIT EST AGE 18-39: CPT | Performed by: FAMILY MEDICINE

## 2024-03-13 PROCEDURE — 36415 COLL VENOUS BLD VENIPUNCTURE: CPT | Performed by: FAMILY MEDICINE

## 2024-03-13 PROCEDURE — 83036 HEMOGLOBIN GLYCOSYLATED A1C: CPT | Performed by: FAMILY MEDICINE

## 2024-03-13 PROCEDURE — 80053 COMPREHEN METABOLIC PANEL: CPT | Performed by: FAMILY MEDICINE

## 2024-03-13 PROCEDURE — 85027 COMPLETE CBC AUTOMATED: CPT | Performed by: FAMILY MEDICINE

## 2024-03-13 PROCEDURE — 87389 HIV-1 AG W/HIV-1&-2 AB AG IA: CPT | Performed by: FAMILY MEDICINE

## 2024-03-13 PROCEDURE — 80061 LIPID PANEL: CPT | Performed by: FAMILY MEDICINE

## 2024-03-13 PROCEDURE — 99214 OFFICE O/P EST MOD 30 MIN: CPT | Mod: 25 | Performed by: FAMILY MEDICINE

## 2024-03-13 PROCEDURE — 84443 ASSAY THYROID STIM HORMONE: CPT | Performed by: FAMILY MEDICINE

## 2024-03-13 SDOH — HEALTH STABILITY: PHYSICAL HEALTH: ON AVERAGE, HOW MANY DAYS PER WEEK DO YOU ENGAGE IN MODERATE TO STRENUOUS EXERCISE (LIKE A BRISK WALK)?: 2 DAYS

## 2024-03-13 ASSESSMENT — SOCIAL DETERMINANTS OF HEALTH (SDOH): HOW OFTEN DO YOU GET TOGETHER WITH FRIENDS OR RELATIVES?: ONCE A WEEK

## 2024-03-13 NOTE — PROGRESS NOTES
"Preventive Care Visit  [facility]  WILLIAN K MD VAL, Family Medicine  Mar 13, 2024    1. Adult general medical exam  This is a 39 yo female here for physical exam     2. Class 2 severe obesity due to excess calories with serious comorbidity and body mass index (BMI) of 36.0 to 36.9 in adult (H)  Patient continues to use Semaglutide (will be getting compounded formula from [place] [facility] ) - looking forward to continuing her weight loss journey - discussed the importance of taking care of herself as well - needs adequate sleep     3. PCOS (polycystic ovarian syndrome)  H/o PCOS - will check A1c / insulin resistance   - Hemoglobin A1c; Future  - Hemoglobin A1c    4. S/P hysterectomy  6. Endometriosis  Patient notes that she feels \"so much better\" after her hysterectomy - but then describes pain associated with endometriosis -     5. Primary hypertension  Blood pressure elevated - discussed - patient will be checking her BP at home and report back in next 3-4 weeks.  Did not wish to start medication if not necessary.    - CBC with platelets; Future  - Comprehensive metabolic panel (BMP + Alb, Alk Phos, ALT, AST, Total. Bili, TP); Future  - Lipid Profile (Chol, Trig, HDL, LDL calc); Future  - TSH; Future  - CBC with platelets  - Comprehensive metabolic panel (BMP + Alb, Alk Phos, ALT, AST, Total. Bili, TP)  - Lipid Profile (Chol, Trig, HDL, LDL calc)  - TSH    7. Screening for HIV (human immunodeficiency virus)  Discussed recommendation for HIV screening - low risk - ordered    - HIV Antigen Antibody Combo; Future  - HIV Antigen Antibody Combo    8. Need for hepatitis C screening test  Discussed recommendation for Hepatitis C screening - low risk - ordered    - Hepatitis C Screen Reflex to HCV RNA Quant and Genotype; Future  - Hepatitis C Screen Reflex to HCV RNA Quant and Genotype    9. Encounter for completion of form with patient  Patient desires letter to restrict number " "of days she needs to go in to office for work; her primary job is at [employer].  She is now required to go in 3 times/week.  Wants to restrict to only 2 times/week.  Discussed at length.  This is somewhat vague, but does do better at home regarding her ability to get up and just get going.        Subjective   Evita is a 38 year old, presenting for the following:  Physical        3/13/2024     7:22 AM   Additional Questions   Roomed by [facility] Care Directive  Patient does not have a Health Care Directive or Living Will: no written documents    Had a hysterectomy - for endometriosis  \"Never felt better\"  Did the most moving around since then - life is completely changed   April 2023 -   Has been able to lose weight -   Thinks the hysterectomy had somehting to do with that -   Was having food cravings with periods - had   221# preop, then 213#, now 191#   Feels like ADD is back in full force -   Was constatnly tired and in pain   Now, the end o is gone - constantly jacked up - always moving,   Has 3 jobs -   Constantly moving/rearranging   Leg twitch   ,   Then gets tired after that -   [employer] - full time  / - weekend/nights; pull tabs at 2 locations     Sleeping - bed at 8:30 pm - if not working - up at 5   Otherwise bed at 11 pm, up at 5    Goes to office 3 days/week - starts early   Wants a note to only go in 2 days/week -     Endometriosis is \"still there\" - - could come back any time   Still having rectal pain  Endometriosis is on posterior vagina and rectum; also has pain into buttocks  Still not as bad as the painful periods - which was really draining    Was on some weight loss drugs x 5 months -   Now, insurance changed - no longer covered for weight loss purposes  Reached out to [facility] - will get back on compounded Semaglutide -   It \"calms the food noise\"  Craves healthier foods  Since being off the meds, has been able to maintain   Is doing some exercise -     Declines " flu, COVID vaccine    Concerned by hyper/fidgety -               3/13/2024   General Health   How would you rate your overall physical health? Good   Feel stress (tense, anxious, or unable to sleep) Only a little   (!) STRESS CONCERN      3/13/2024   Nutrition   Three or more servings of calcium each day? Yes   Diet: Regular (no restrictions)   How many servings of fruit and vegetables per day? (!) 0-1   How many sweetened beverages each day? 0-1         3/13/2024   Exercise   Days per week of moderate/strenous exercise 2 days   (!) EXERCISE CONCERN      3/13/2024   Social Factors   Frequency of gathering with friends or relatives Once a week   Worry food won't last until get money to buy more No   Food not last or not have enough money for food? No   Do you have housing?  Yes   Are you worried about losing your housing? No   Lack of transportation? No   Unable to get utilities (heat,electricity)? No         3/13/2024   Dental   Dentist two times every year? Yes         3/13/2024   TB Screening   Were you born outside of [place]?  No         Today's PHQ-2 Score:       3/13/2024     7:05 AM   PHQ-2 (  [employer])   Q1: Little interest or pleasure in doing things 0   Q2: Feeling down, depressed or hopeless 0   PHQ-2 Score 0   Q1: Little interest or pleasure in doing things Not at all   Q2: Feeling down, depressed or hopeless Not at all   PHQ-2 Score 0           3/13/2024   Substance Use   Alcohol more than 3/day or more than 7/wk No   Do you use any other substances recreationally? No     Social History     Tobacco Use    Smoking status: Former     Packs/day: 1     Types: Cigarettes     Quit date: 2015     Years since quittin.2    Smokeless tobacco: Never    Tobacco comments:     one pack every 3 days   Vaping Use    Vaping Use: Never used   Substance Use Topics    Alcohol use: Yes     Comment: once per month-4 drinks    Drug use: No             3/13/2024   Breast Cancer Screening   Family history of breast, colon, or  ovarian cancer? No / Unknown      Mammogram Screening - Patient under 40 years of age: Routine Mammogram Screening not recommended.           3/13/2024   One time HIV Screening   Previous HIV test? Yes         3/13/2024   STI Screening   New sexual partner(s) since last STI/HIV test? No     History of abnormal Pap smear: Status post benign hysterectomy. Health Maintenance and Surgical History updated.        4/21/2021     1:39 PM   PAP / HPV   PAP-ABSTRACT See Scanned Document           This result is from an external source.          Reviewed and updated as needed this visit by Provider                    No past medical history on file.  Past Surgical History:   Procedure Laterality Date    CYSTOSCOPY N/A 9/5/2018    Procedure: CYSTOSCOPY;;  Surgeon: Onel Bergman MD;  Location:  OR    DAVINCI ASSISTED ABLATION / EXCISION OF ENDOMETRIOSIS N/A 9/5/2018    Procedure: DAVINCI ASSISTED ABLATION / EXCISION OF ENDOMETRIOSIS;  DAVINCI LAPAROSCOPY, EXCISION OF ENDOMETRIOSIS, BILATERAL URETEROLYSIS, HYSTEROSCOPY, DILATION AND CURETTAGE, CYSTOSCOPY;  Surgeon: Onel Bergman MD;  Location:  OR    DILATION AND CURETTAGE, HYSTEROSCOPY DIAGNOSTIC, COMBINED N/A 9/5/2018    Procedure: COMBINED DILATION AND CURETTAGE, HYSTEROSCOPY DIAGNOSTIC;;  Surgeon: Onel Bergman MD;  Location:  OR    ENT SURGERY      oral    GYN SURGERY      hysteroscopy, d and c     OB History   No obstetric history on file.     BP Readings from Last 3 Encounters:   03/13/24 (!) 146/98   04/03/23 (!) 152/82   02/08/23 136/88    Wt Readings from Last 3 Encounters:   03/13/24 88 kg (194 lb)   04/03/23 100.6 kg (221 lb 12 oz)   02/08/23 98 kg (216 lb)                  Patient Active Problem List   Diagnosis    Endometriosis    Anxiety state    Attention deficit hyperactivity disorder (ADHD)    Hypertension    Rash and other nonspecific skin eruption    Thyroid nodule    Obesity    PCOS (polycystic ovarian syndrome)    Unable  to lose weight     Past Surgical History:   Procedure Laterality Date    CYSTOSCOPY N/A 2018    Procedure: CYSTOSCOPY;;  Surgeon: Onel Bergman MD;  Location:  OR    DAVINCI ASSISTED ABLATION / EXCISION OF ENDOMETRIOSIS N/A 2018    Procedure: DAVINCI ASSISTED ABLATION / EXCISION OF ENDOMETRIOSIS;  DAVINCI LAPAROSCOPY, EXCISION OF ENDOMETRIOSIS, BILATERAL URETEROLYSIS, HYSTEROSCOPY, DILATION AND CURETTAGE, CYSTOSCOPY;  Surgeon: Onel Bergman MD;  Location:  OR    DILATION AND CURETTAGE, HYSTEROSCOPY DIAGNOSTIC, COMBINED N/A 2018    Procedure: COMBINED DILATION AND CURETTAGE, HYSTEROSCOPY DIAGNOSTIC;;  Surgeon: Onel Bergman MD;  Location:  OR    ENT SURGERY      oral    GYN SURGERY      hysteroscopy, d and c       Social History     Tobacco Use    Smoking status: Former     Packs/day: 1     Types: Cigarettes     Quit date:      Years since quittin.2    Smokeless tobacco: Never    Tobacco comments:     one pack every 3 days   Substance Use Topics    Alcohol use: Yes     Comment: once per month-4 drinks     No family history on file.      Current Outpatient Medications   Medication Sig Dispense Refill    Semaglutide-Weight Management (WEGOVY) 1 MG/0.5ML pen Inject 1.5 mg Subcutaneous once a week 4 mL 1    sertraline (ZOLOFT) 100 MG tablet Take 1 tablet (100 mg) by mouth daily 90 tablet 2     No Known Allergies    Review of Systems   Constitutional:  Negative for chills and fever.   HENT:  Negative for ear pain and sore throat.    Eyes:  Negative for pain and visual disturbance.   Respiratory:  Negative for cough and shortness of breath.    Cardiovascular:  Negative for chest pain and palpitations.   Gastrointestinal:  Negative for abdominal pain and vomiting.   Genitourinary:  Negative for dysuria and hematuria.   Musculoskeletal:  Negative for arthralgias and back pain.   Skin:  Negative for color change and rash.   Neurological:  Negative for seizures and  "syncope.   All other systems reviewed and are negative.         Objective    Exam  BP (!) 146/98 (BP Location: Left arm, Patient Position: Sitting, Cuff Size: Adult Large)   Pulse 71   Temp 97.7  F (36.5  C) (Temporal)   Resp 18   Ht 1.645 m (5' 4.76\")   Wt 88 kg (194 lb)   LMP 04/01/2023 (Approximate)   SpO2 99%   BMI 32.52 kg/m     Estimated body mass index is 32.52 kg/m  as calculated from the following:    Height as of this encounter: 1.645 m (5' 4.76\").    Weight as of this encounter: 88 kg (194 lb).    Physical Exam  Vitals reviewed.   Constitutional:       General: She is not in acute distress.     Appearance: Normal appearance.   HENT:      Head: Normocephalic.      Right Ear: Tympanic membrane, ear canal and external ear normal.      Left Ear: Tympanic membrane, ear canal and external ear normal.      Nose: Nose normal.      Mouth/Throat:      Mouth: Mucous membranes are moist.      Pharynx: No posterior oropharyngeal erythema.   Eyes:      Extraocular Movements: Extraocular movements intact.      Conjunctiva/sclera: Conjunctivae normal.      Pupils: Pupils are equal, round, and reactive to light.   Cardiovascular:      Rate and Rhythm: Normal rate and regular rhythm.      Pulses: Normal pulses.      Heart sounds: Normal heart sounds. No murmur heard.  Pulmonary:      Effort: Pulmonary effort is normal.      Breath sounds: Normal breath sounds.   Abdominal:      Palpations: Abdomen is soft. There is no mass.      Tenderness: There is no abdominal tenderness. There is no guarding or rebound.   Musculoskeletal:         General: No deformity. Normal range of motion.      Cervical back: Normal range of motion and neck supple.   Lymphadenopathy:      Cervical: No cervical adenopathy.   Skin:     General: Skin is warm and dry.   Neurological:      General: No focal deficit present.      Mental Status: She is alert.   Psychiatric:         Mood and Affect: Mood normal.         Behavior: Behavior normal. "               Signed Electronically by: WILLIAN NEAL MD      Prior to immunization administration, verified patients identity using patient s name and date of birth. Please see Immunization Activity for additional information.     Screening Questionnaire for Adult Immunization    Are you sick today?   No   Do you have allergies to medications, food, a vaccine component or latex?   No   Have you ever had a serious reaction after receiving a vaccination?   No   Do you have a long-term health problem with heart, lung, kidney, or metabolic disease (e.g., diabetes), asthma, a blood disorder, no spleen, complement component deficiency, a cochlear implant, or a spinal fluid leak?  Are you on long-term aspirin therapy?   No   Do you have cancer, leukemia, HIV/AIDS, or any other immune system problem?   No   Do you have a parent, brother, or sister with an immune system problem?   No   In the past 3 months, have you taken medications that affect  your immune system, such as prednisone, other steroids, or anticancer drugs; drugs for the treatment of rheumatoid arthritis, Crohn s disease, or psoriasis; or have you had radiation treatments?   No   Have you had a seizure, or a brain or other nervous system problem?   No   During the past year, have you received a transfusion of blood or blood    products, or been given immune (gamma) globulin or antiviral drug?   No   For women: Are you pregnant or is there a chance you could become       pregnant during the next month?   No   Have you received any vaccinations in the past 4 weeks?   No     Immunization questionnaire answers were all negative.      Patient instructed to remain in clinic for 15 minutes afterwards, and to report any adverse reactions.     Screening performed by Mary Chin CMA on 3/13/2024 at 7:24 AM.

## 2024-03-13 NOTE — LETTER
3/13/2024     Evita Fiore   1985  [address] [place] [address]       To Whom It May Concern:    Please allow Evita to work from home three days/week.  She benefits from a physical standpoint from being able to work from home due to underlying conditions.      If you have further questions, please do not hesitate to contact me.     Sincerely,        WILLIAN NEAL MD

## 2024-03-14 LAB
ALBUMIN SERPL BCG-MCNC: 4.6 G/DL (ref 3.5–5.2)
ALP SERPL-CCNC: 55 U/L (ref 40–150)
ALT SERPL W P-5'-P-CCNC: 13 U/L (ref 0–50)
ANION GAP SERPL CALCULATED.3IONS-SCNC: 10 MMOL/L (ref 7–15)
AST SERPL W P-5'-P-CCNC: 19 U/L (ref 0–45)
BILIRUB SERPL-MCNC: 0.4 MG/DL
BUN SERPL-MCNC: 11.6 MG/DL (ref 6–20)
CALCIUM SERPL-MCNC: 9.5 MG/DL (ref 8.6–10)
CHLORIDE SERPL-SCNC: 103 MMOL/L (ref 98–107)
CHOLEST SERPL-MCNC: 178 MG/DL
CREAT SERPL-MCNC: 0.75 MG/DL (ref 0.51–0.95)
DEPRECATED HCO3 PLAS-SCNC: 26 MMOL/L (ref 22–29)
EGFRCR SERPLBLD CKD-EPI 2021: >90 ML/MIN/1.73M2
FASTING STATUS PATIENT QL REPORTED: YES
GLUCOSE SERPL-MCNC: 69 MG/DL (ref 70–99)
HCV AB SERPL QL IA: NONREACTIVE
HDLC SERPL-MCNC: 64 MG/DL
HIV 1+2 AB+HIV1 P24 AG SERPL QL IA: NONREACTIVE
LDLC SERPL CALC-MCNC: 97 MG/DL
NONHDLC SERPL-MCNC: 114 MG/DL
POTASSIUM SERPL-SCNC: 4.5 MMOL/L (ref 3.4–5.3)
PROT SERPL-MCNC: 7.3 G/DL (ref 6.4–8.3)
SODIUM SERPL-SCNC: 139 MMOL/L (ref 135–145)
TRIGL SERPL-MCNC: 84 MG/DL
TSH SERPL DL<=0.005 MIU/L-ACNC: 1.6 UIU/ML (ref 0.3–4.2)

## 2024-03-16 ASSESSMENT — ENCOUNTER SYMPTOMS
BACK PAIN: 0
COUGH: 0
SORE THROAT: 0
CHILLS: 0
COLOR CHANGE: 0
PALPITATIONS: 0
FEVER: 0
ARTHRALGIAS: 0
HEMATURIA: 0
SHORTNESS OF BREATH: 0
EYE PAIN: 0
SEIZURES: 0
DYSURIA: 0
VOMITING: 0
ABDOMINAL PAIN: 0

## 2024-05-19 ENCOUNTER — MYC REFILL (OUTPATIENT)
Dept: FAMILY MEDICINE | Facility: CLINIC | Age: 39
End: 2024-05-19
Payer: COMMERCIAL

## 2024-05-19 DIAGNOSIS — E66.812 CLASS 2 SEVERE OBESITY DUE TO EXCESS CALORIES WITH SERIOUS COMORBIDITY AND BODY MASS INDEX (BMI) OF 36.0 TO 36.9 IN ADULT (H): ICD-10-CM

## 2024-05-19 DIAGNOSIS — E28.2 PCOS (POLYCYSTIC OVARIAN SYNDROME): ICD-10-CM

## 2024-05-19 DIAGNOSIS — R63.8 UNABLE TO LOSE WEIGHT: ICD-10-CM

## 2024-05-19 DIAGNOSIS — E66.01 CLASS 2 SEVERE OBESITY DUE TO EXCESS CALORIES WITH SERIOUS COMORBIDITY AND BODY MASS INDEX (BMI) OF 36.0 TO 36.9 IN ADULT (H): ICD-10-CM

## 2024-08-15 ENCOUNTER — NURSE TRIAGE (OUTPATIENT)
Dept: FAMILY MEDICINE | Facility: CLINIC | Age: 39
End: 2024-08-15
Payer: COMMERCIAL

## 2024-08-15 NOTE — TELEPHONE ENCOUNTER
Woke up middle of the night and had diarrhea x2, the subsequently felt extremely nauseous  Was very pale and lips were pale purple  Denies syncope but felt very light headed, and now this morning feels very fatigued. Sx have otherwise resolved this morning.  Denies abdominal pain, afebrile  /75, CA 73, denies SOB, denies chest pain    Disposition of home care tx for now, patient in agreement with this plan. Home care advice given and pt advised to call back with any worsening sx. The patient indicates understanding of these issues and agrees with the plan.    RUMA Velasquez, RN (she/her)  [facility] RN      Reason for Disposition   SEVERE diarrhea (e.g., 7 or more times / day more than normal)    Additional Information   Negative: Shock suspected (e.g., cold/pale/clammy skin, too weak to stand, low BP, rapid pulse)   Negative: Difficult to awaken or acting confused (e.g., disoriented, slurred speech)   Negative: Sounds like a life-threatening emergency to the triager   Negative: Vomiting also present and worse than the diarrhea   Negative: Blood in stool and without diarrhea   Negative: SEVERE abdominal pain (e.g., excruciating) and present > 1 hour   Negative: SEVERE abdominal pain and age > 60 years   Negative: Bloody, black, or tarry bowel movements  (Exception: Chronic-unchanged black-grey bowel movements and is taking iron pills or Pepto-Bismol.)   Negative: SEVERE diarrhea (e.g., 7 or more times / day more than normal) and age > 60 years   Negative: Constant abdominal pain lasting > 2 hours   Negative: Drinking very little and dehydration suspected (e.g., no urine > 12 hours, very dry mouth, very lightheaded)   Negative: Patient sounds very sick or weak to the triager   Negative: SEVERE diarrhea (e.g., 7 or more times / day more than normal) and present > 24 hours (1 day)   Negative: MODERATE diarrhea (e.g., 4-6 times / day more than normal) and present > 48 hours (2  days)   Negative: MODERATE diarrhea (e.g., 4-6 times / day more than normal) and age > 70 years   Negative: Abdominal pain (Exception: Pain clears completely with each passage of diarrhea stool.)   Negative: Fever > 101 F (38.3 C)   Negative: Blood in the stool  (Exception: Only on toilet paper. Reason: Diarrhea can cause rectal irritation with blood on wiping.)   Negative: Mucus or pus in stool has been present > 2 days and diarrhea is more than mild   Negative: Weak immune system (e.g., HIV positive, cancer chemo, splenectomy, organ transplant, chronic steroids)   Negative: Travel to a foreign country in past month   Negative: Recent antibiotic therapy (i.e., within last 2 months) and diarrhea present > 3 days since antibiotic was stopped   Negative: Recent hospitalization and diarrhea present > 3 days   Negative: Tube feedings (e.g., nasogastric, g-tube, j-tube)   Negative: MILD diarrhea (e.g., 1-3 or more stools than normal in past 24 hours) diarrhea without known cause and present > 7 days   Negative: Patient wants to be seen   Negative: Diarrhea is a chronic symptom (recurrent or ongoing AND lasting > 4 weeks)    Protocols used: Diarrhea-A-OH

## 2024-08-27 NOTE — TELEPHONE ENCOUNTER
Medication Question or Refill    Contacts         Type Contact Phone/Fax    02/26/2024 10:49 AM CST Web (Incoming) Evita Fiore (Self)             What medication are you calling about (include dose and sig)?: Zoloft 100 mg.     Preferred Pharmacy:        [id number] [address] & [address] [address]  Phone: [phone] Fax: [phone]      Controlled Substance Agreement on file:   CSA -- Patient Level:    CSA: None found at the patient level.       Who prescribed the medication?: Dr. Arnold     Do you need a refill? Yes    When did you use the medication last? 2/29/24    Patient offered an appointment? No.  Has upcoming appt with pcp scheduled for 3/13/24.     Do you have any questions or concerns?  Yes: Patient reported out of medication.  Has made a few pharmacy to pharmacy transfers from [facility] to [facility] in which may have interfered with the refill status.       Could we send this information to you in [employer] or would you prefer to receive a phone call?:   Patient would like to be contacted via [employer]    Will route encounter to provider for review pending medication along with pharmacy and approve the refill if appropriate.  Patient reported completely out.     Daniel Godwin, JIMBON, RN  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  A normal functioning pacemaker is in place.   negative

## 2024-10-29 ENCOUNTER — OFFICE VISIT (OUTPATIENT)
Dept: FAMILY MEDICINE | Facility: CLINIC | Age: 39
End: 2024-10-29
Payer: COMMERCIAL

## 2024-10-29 VITALS
OXYGEN SATURATION: 99 % | WEIGHT: 199 LBS | TEMPERATURE: 97.1 F | HEIGHT: 65 IN | SYSTOLIC BLOOD PRESSURE: 138 MMHG | BODY MASS INDEX: 33.15 KG/M2 | HEART RATE: 68 BPM | RESPIRATION RATE: 18 BRPM | DIASTOLIC BLOOD PRESSURE: 82 MMHG

## 2024-10-29 DIAGNOSIS — F90.0 ATTENTION DEFICIT HYPERACTIVITY DISORDER (ADHD), PREDOMINANTLY INATTENTIVE TYPE: ICD-10-CM

## 2024-10-29 DIAGNOSIS — N63.20 MASS OF LEFT BREAST, UNSPECIFIED QUADRANT: Primary | ICD-10-CM

## 2024-10-29 PROCEDURE — 99213 OFFICE O/P EST LOW 20 MIN: CPT | Performed by: FAMILY MEDICINE

## 2024-10-29 PROCEDURE — G2211 COMPLEX E/M VISIT ADD ON: HCPCS | Performed by: FAMILY MEDICINE

## 2024-10-29 RX ORDER — METHYLPHENIDATE HYDROCHLORIDE 18 MG/1
18 TABLET ORAL EVERY MORNING
Qty: 30 TABLET | Refills: 0 | Status: SHIPPED | OUTPATIENT
Start: 2024-10-29

## 2024-10-29 NOTE — PROGRESS NOTES
"  1. Mass of left breast, unspecified quadrant (Primary)  This is a (nearly) 40 yo female who found a \"mass\" in her upper left breast.  She is concerned by this recent finding and made an appointment today.  She does not regularly check her breasts so has no idea how long it may have been there.  Exam of her breasts suggests some irregularity/mass at about midline of upper left breast.  Mammogram/ultrasound have been requested as soon as possible.    - US Breast Left Limited 1-3 Quadrants; Future  - MA Diagnostic Bilateral w/Benjamin; Future    2. Attention deficit hyperactivity disorder (ADHD), predominantly inattentive type  Patient also notes she is struggling with staying organized and on task at work.  It is affecting her work.  She'd like to return to her previous ADHD treatment.  Reviewed med/dosing with her.  She confirms she'd like to start with the Methylphenidate 18 mg.  I think this is reasonable.    - methylphenidate HCl ER, OSM, (CONCERTA) 18 MG CR tablet; Take 1 tablet (18 mg) by mouth every morning.  Dispense: 30 tablet; Refill: 0    The longitudinal plan of care for the diagnosis(es)/condition(s) as documented were addressed during this visit. Due to the added complexity in care, I will continue to support Evita in the subsequent management and with ongoing continuity of care.    Subjective   Evita is a 38 year old, presenting for the following health issues:  Breast Mass (Pt stated lump on left breast no pain or drainage )      10/29/2024    10:10 AM   Additional Questions   Roomed by Shanita     Found left breast lump today       History of Present Illness       Reason for visit:  Lump in breast  Symptom onset:  Today   She is taking medications regularly.       Review of Systems   Constitutional:  Negative for chills and fever.   HENT:  Negative for ear pain and sore throat.    Eyes:  Negative for pain and visual disturbance.   Respiratory:  Negative for cough and shortness of breath.  " "  Cardiovascular:  Negative for chest pain and palpitations.   Gastrointestinal:  Negative for abdominal pain and vomiting.   Genitourinary:  Negative for dysuria and hematuria.   Musculoskeletal:  Negative for arthralgias and back pain.   Skin:  Negative for color change and rash.   Neurological:  Negative for seizures and syncope.   Psychiatric/Behavioral:  Positive for decreased concentration.    All other systems reviewed and are negative.          Objective    /82 (BP Location: Right arm, Patient Position: Sitting, Cuff Size: Adult Regular)   Pulse 68   Temp 97.1  F (36.2  C) (Temporal)   Resp 18   Ht 1.645 m (5' 4.76\")   Wt 90.3 kg (199 lb)   LMP 04/01/2023 (Approximate)   SpO2 99%   BMI 33.36 kg/m    Body mass index is 33.36 kg/m .  Physical Exam  Vitals reviewed.   Constitutional:       General: She is not in acute distress.     Appearance: Normal appearance.   HENT:      Head: Normocephalic.      Right Ear: Tympanic membrane, ear canal and external ear normal.      Left Ear: Tympanic membrane, ear canal and external ear normal.      Nose: Nose normal.      Mouth/Throat:      Mouth: Mucous membranes are moist.      Pharynx: No posterior oropharyngeal erythema.   Eyes:      Extraocular Movements: Extraocular movements intact.      Conjunctiva/sclera: Conjunctivae normal.      Pupils: Pupils are equal, round, and reactive to light.   Cardiovascular:      Rate and Rhythm: Normal rate and regular rhythm.      Pulses: Normal pulses.      Heart sounds: Normal heart sounds. No murmur heard.  Pulmonary:      Effort: Pulmonary effort is normal.      Breath sounds: Normal breath sounds.   Chest:          Comments: Lump at midline upper left breast  Abdominal:      Palpations: Abdomen is soft. There is no mass.      Tenderness: There is no abdominal tenderness. There is no guarding or rebound.   Musculoskeletal:         General: No deformity. Normal range of motion.      Cervical back: Normal range of " motion and neck supple.   Lymphadenopathy:      Cervical: No cervical adenopathy.   Skin:     General: Skin is warm and dry.   Neurological:      General: No focal deficit present.      Mental Status: She is alert.   Psychiatric:         Mood and Affect: Mood normal.         Behavior: Behavior normal.            No results found for any visits on 10/29/24.        Signed Electronically by: WILLIAN NEAL MD  Prior to immunization administration, verified patients identity using patient s name and date of birth. Please see Immunization Activity for additional information.     Screening Questionnaire for Adult Immunization    Are you sick today?   No   Do you have allergies to medications, food, a vaccine component or latex?   No   Have you ever had a serious reaction after receiving a vaccination?   No   Do you have a long-term health problem with heart, lung, kidney, or metabolic disease (e.g., diabetes), asthma, a blood disorder, no spleen, complement component deficiency, a cochlear implant, or a spinal fluid leak?  Are you on long-term aspirin therapy?   No   Do you have cancer, leukemia, HIV/AIDS, or any other immune system problem?   No   Do you have a parent, brother, or sister with an immune system problem?   No   In the past 3 months, have you taken medications that affect  your immune system, such as prednisone, other steroids, or anticancer drugs; drugs for the treatment of rheumatoid arthritis, Crohn s disease, or psoriasis; or have you had radiation treatments?   No   Have you had a seizure, or a brain or other nervous system problem?   No   During the past year, have you received a transfusion of blood or blood    products, or been given immune (gamma) globulin or antiviral drug?   No   For women: Are you pregnant or is there a chance you could become       pregnant during the next month?   No   Have you received any vaccinations in the past 4 weeks?   No     Immunization questionnaire  answers were all negative.      Patient instructed to remain in clinic for 15 minutes afterwards, and to report any adverse reactions.     Screening performed by Shanita Vences on 10/29/2024 at 10:13 AM.

## 2024-10-31 ENCOUNTER — HOSPITAL ENCOUNTER (OUTPATIENT)
Dept: MAMMOGRAPHY | Facility: CLINIC | Age: 39
Discharge: HOME | End: 2024-10-31
Attending: FAMILY MEDICINE
Payer: COMMERCIAL

## 2024-10-31 DIAGNOSIS — N63.20 MASS OF LEFT BREAST, UNSPECIFIED QUADRANT: ICD-10-CM

## 2024-10-31 PROCEDURE — 76642 ULTRASOUND BREAST LIMITED: CPT | Mod: LT

## 2024-10-31 PROCEDURE — 77066 DX MAMMO INCL CAD BI: CPT

## 2024-10-31 NOTE — PROGRESS NOTES
Radiologist, Dr Margarito Donovan, recommends left breast ultrasound guided needle biopsy.     While patient, who was accompanied by , was at [facility], I scheduled pt for breast biopsy appt on Monday 12/14/24, arrival at 0815am for 0830am appt, at [facility], [address], [address], [place], [place]. [phone].      I reviewed the procedure and the written pre and post breast biopsy pt handouts with patient and gave patient the written pre and post biopsy patient handouts to take home.     Pt verbalizes understanding of procedure and appt location, date, and time. Calls welcomed.    Danielle Isbell, RN, BSN, [facility]  [facility]

## 2024-10-31 NOTE — LETTER
Evita Fiore  [address] [place] [address]            October 31, 2024  Date of Exam: 10/31/2024    Dear Evita:    Thank you for your recent visit.    Breast Imaging Result: Based on your recent breast imaging, you have a suspicious area that usually requires a biopsy, at which time a small tissue sample would be taken from your breast.      Your breast tissue is dense:  Breast tissue can be either dense or not dense. Dense tissue makes it harder to find breast cancer on a mammogram and also raises the risk of developing breast cancer. Your breast tissue is dense. In some people with dense tissue, other imaging tests in addition to a mammogram may help find cancers. Talk to your healthcare provider about breast density, risks for breast cancer, and your individual situation.    If you have already made these arrangements, please disregard this letter.    A report of your breast imaging results was sent to: Christina Orta    Your breast imaging will become part of your medical file here at [facility] for at least 10 years. You are responsible for informing any new health care team or breast imaging facility of the date and location of this examination.    We appreciate the opportunity to participate in your health care.    Sincerely,  Margarito Donovan MD   [facility] [place]

## 2024-11-03 ASSESSMENT — ENCOUNTER SYMPTOMS
DYSURIA: 0
SHORTNESS OF BREATH: 0
COLOR CHANGE: 0
ABDOMINAL PAIN: 0
COUGH: 0
CHILLS: 0
PALPITATIONS: 0
HEMATURIA: 0
DECREASED CONCENTRATION: 1
EYE PAIN: 0
SEIZURES: 0
BACK PAIN: 0
VOMITING: 0
FEVER: 0
SORE THROAT: 0
ARTHRALGIAS: 0

## 2024-11-04 ENCOUNTER — HOSPITAL ENCOUNTER (OUTPATIENT)
Dept: MAMMOGRAPHY | Facility: CLINIC | Age: 39
Discharge: HOME | End: 2024-11-04
Attending: FAMILY MEDICINE
Payer: COMMERCIAL

## 2024-11-04 DIAGNOSIS — N63.20 MASS OF LEFT BREAST, UNSPECIFIED QUADRANT: ICD-10-CM

## 2024-11-04 PROCEDURE — 250N000009 HC RX 250: Performed by: FAMILY MEDICINE

## 2024-11-04 PROCEDURE — 999N000065 MA POST PROCEDURE LEFT

## 2024-11-04 PROCEDURE — 88305 TISSUE EXAM BY PATHOLOGIST: CPT | Mod: 26 | Performed by: PATHOLOGY

## 2024-11-04 PROCEDURE — 88305 TISSUE EXAM BY PATHOLOGIST: CPT | Mod: TC | Performed by: FAMILY MEDICINE

## 2024-11-04 PROCEDURE — 19083 BX BREAST 1ST LESION US IMAG: CPT | Mod: LT

## 2024-11-04 RX ADMIN — LIDOCAINE HYDROCHLORIDE 10 ML: 10 INJECTION, SOLUTION EPIDURAL; INFILTRATION; INTRACAUDAL; PERINEURAL at 08:43

## 2024-11-04 NOTE — PROGRESS NOTES
Evita arrived at [facility]. I escorted pt to the Breast [place] consult room. Pt was identified using full name and . Wristband is on pt wrist. Pt was able to state which procedure and correct side breast biopsy was occurring today. I reviewed the consent form with the pt and pt was given the consent form to review before signing.     I reviewed post breast biopsy care. Pt acknowledged understanding of post biopsy care. Pt was given written post breast biopsy care handout to take home.    I explained results are expected in 3-5 business days and Breast Center RN will call pt at number pt provided today. Pt has active MyChart, therefore, I explained pathology result alert may occur and reach pt before RN can call to discuss results, and it is up to pt to decide to view results or wait for RN call. Pt verbalizes understanding.    Pt had no questions or concerns.     I escorted pt to the changing room and pt changed into gown. Pt placed personal belongings in a locker and pt has the nair. I escorted pt to sub-waiting area and pt sat on chair while waiting for [employer] [facility] Re[facility]. I offered pt the aroma therapy of Calming Blend oil and a warm blanket, and pt accepted only the warm blanket. I notified [employer] [facility], pt was ready and waiting in sub-waiting area.     Danielle Isbell, RN, BSN, CBCN  [facility]

## 2024-11-05 ENCOUNTER — TELEPHONE (OUTPATIENT)
Dept: MAMMOGRAPHY | Facility: CLINIC | Age: 39
End: 2024-11-05
Payer: COMMERCIAL

## 2024-11-05 LAB
PATH REPORT.COMMENTS IMP SPEC: NORMAL
PATH REPORT.FINAL DX SPEC: NORMAL
PATH REPORT.GROSS SPEC: NORMAL
PATH REPORT.MICROSCOPIC SPEC OTHER STN: NORMAL
PATH REPORT.RELEVANT HX SPEC: NORMAL
PHOTO IMAGE: NORMAL

## 2024-11-05 NOTE — TELEPHONE ENCOUNTER
"Following Radiologist, Dr Jazmine Horan's review of 24, left breast biopsy pathology and imaging, and at Dr. Horan's request, I telephoned patient, confirming pt identity using name and , to review the benign pathology results and Radiologist's recommendation to return to routine mammogram screening in one year.    Pt verbalizes understanding of information provided during this call and acceptance of plan. I welcomed calls if any questions or concerns.     I will CC this note to ordering provider, Dr Christina Orta.     Please see my telephone encounters from earlier today regarding post breast biopsy concerns that pt called me about. Pt stated during this call there \"really isn't much pain\" but states pt's mother was concerned about the size of bruise and that is why pt called today. Pt will continue to do homecare and monitor for concerning symptoms, and call if any questions, concerns, or new worrisome symptoms appear.     Danielle Isbell, RN, BSN, [facility]  [facility] [place]         "

## 2024-11-05 NOTE — TELEPHONE ENCOUNTER
"Patient called and left a message at 0845am, requesting a return call with post breast biopsy concerns.    0903am: I returned pt's call.     Patient had left breast biopsy on 11/4/24 at [facility]. Pathology pending.    Patient reports:   \"Baseball\" size dark purple bruise with some redness at edge of bruise and at biopsy entrance site. Pt wasn't aware of a lump until I asked if she could feel a lump and she can feel a \"hard\" lump under the bruise. Pt rates pain \"5\" on 0-10 pain scale. Pt has not used pain relieving medication or cool pack. Pt is wearing a supportive bra. Pt denies fever or increased swelling in breast.     I advised pt to take acetaminophen per pkg directions and apply cool pack to area 15-20 minutes, several times over the next couple of days. I reassured pt that is it likely a hematoma under the skin and that I would   speak to Dr Jazmine Horan, Radiologist, who performed biopsy for further recommendations for care, and call pt back.    I spoke with Dr Horan about the pt's symptoms, and suspects a hematoma, and recommends homecare with acetaminophen and cool packs, and tracing edges of redness with pen/marker to monitor for increased or spreading of redness. Pt to call back if redness increases/spreads, redness becomes warm to touch, pain worsens, lump increases in size, or increased swelling noticed in breast, or if develops fever, or drainage from biopsy site occurs.     I called pt back at 0948 after speaking with Dr Horan and relayed Dr Horan's advise to pt. I advised patient to call back with any questions, worsening symptoms, or new symptoms of concern as mentioned above. I informed pt that hematomas can take several weeks to resolve and the lump may be felt for weeks as it resolves. If symptoms worsen after clinic hours, pt was instructed to call PCP office or go to Urgent Care for evaluation. Pt verbalizes understanding and acceptance of plan.     Danielle Isbell, " RN, BSN, CBCN  [facility]

## 2024-12-20 ENCOUNTER — MYC REFILL (OUTPATIENT)
Dept: FAMILY MEDICINE | Facility: CLINIC | Age: 39
End: 2024-12-20
Payer: COMMERCIAL

## 2024-12-20 DIAGNOSIS — E66.812 CLASS 2 SEVERE OBESITY DUE TO EXCESS CALORIES WITH SERIOUS COMORBIDITY AND BODY MASS INDEX (BMI) OF 36.0 TO 36.9 IN ADULT (H): ICD-10-CM

## 2024-12-20 DIAGNOSIS — E66.01 CLASS 2 SEVERE OBESITY DUE TO EXCESS CALORIES WITH SERIOUS COMORBIDITY AND BODY MASS INDEX (BMI) OF 36.0 TO 36.9 IN ADULT (H): ICD-10-CM

## 2024-12-20 DIAGNOSIS — E28.2 PCOS (POLYCYSTIC OVARIAN SYNDROME): ICD-10-CM

## 2024-12-20 DIAGNOSIS — R63.8 UNABLE TO LOSE WEIGHT: ICD-10-CM

## 2024-12-23 ENCOUNTER — MYC REFILL (OUTPATIENT)
Dept: FAMILY MEDICINE | Facility: CLINIC | Age: 39
End: 2024-12-23
Payer: COMMERCIAL

## 2024-12-23 DIAGNOSIS — F90.0 ATTENTION DEFICIT HYPERACTIVITY DISORDER (ADHD), PREDOMINANTLY INATTENTIVE TYPE: ICD-10-CM

## 2024-12-23 RX ORDER — METHYLPHENIDATE HYDROCHLORIDE 18 MG/1
18 TABLET ORAL EVERY MORNING
Qty: 30 TABLET | Refills: 0 | Status: SHIPPED | OUTPATIENT
Start: 2024-12-23

## 2024-12-30 ENCOUNTER — TELEPHONE (OUTPATIENT)
Dept: FAMILY MEDICINE | Facility: CLINIC | Age: 39
End: 2024-12-30
Payer: COMMERCIAL

## 2024-12-30 DIAGNOSIS — E66.812 CLASS 2 SEVERE OBESITY DUE TO EXCESS CALORIES WITH SERIOUS COMORBIDITY AND BODY MASS INDEX (BMI) OF 36.0 TO 36.9 IN ADULT (H): ICD-10-CM

## 2024-12-30 DIAGNOSIS — E66.01 CLASS 2 SEVERE OBESITY DUE TO EXCESS CALORIES WITH SERIOUS COMORBIDITY AND BODY MASS INDEX (BMI) OF 36.0 TO 36.9 IN ADULT (H): ICD-10-CM

## 2024-12-30 DIAGNOSIS — E28.2 PCOS (POLYCYSTIC OVARIAN SYNDROME): ICD-10-CM

## 2024-12-30 DIAGNOSIS — R63.8 UNABLE TO LOSE WEIGHT: ICD-10-CM

## 2024-12-30 NOTE — TELEPHONE ENCOUNTER
Patient has been getting this medication from the [facility] pharmacy.     PRIOR AUTHORIZATION DENIED    Medication: Semaglutide-Weight Management (WEGOVY) 1 MG/0.5ML pen     Denial Date: 12/30/2024    Denial Rational: Medication is a plan exclusion

## 2024-12-30 NOTE — TELEPHONE ENCOUNTER
Prior Authorization Retail Medication Request    Medication/Dose: Semaglutide-Weight Management (WEGOVY) 1 MG/0.5ML pen   Diagnosis and ICD code (if different than what is on RX):  Unable to lose weight [R63.8], PCOS (polycystic ovarian syndrome) [E28.2], Class 2 severe obesity due to excess calories with serious comorbidity and body mass index (BMI) of 36.0 to 36.9 in adult (H) [E66.812, E66.01, Z68.36]    Insurance   Primary: [employer]  Insurance ID:  [id number]    Pharmacy Information (if different than what is on RX)  Name:  [facility]  Phone:  [phone]   Fax: [phone]    Routed to [facility] team     Gregorio ZAZUETA RN  [facility]

## 2024-12-30 NOTE — TELEPHONE ENCOUNTER
Dr. Arnold,    Please review.  Per patient, she is taking Semaglutide-Weight Management (WEGOVY) 1 MG/0.5ML pen and injecting 1 mg subcutaneously once a week     Gregorio ZAZUETA RN  [facility]

## 2024-12-30 NOTE — TELEPHONE ENCOUNTER
General Call      Reason for Call:  Clarification on Semaglutide-Weight Management (WEGOVY) 1 MG/0.5ML pen     What are your questions or concerns:  Per pharmacy, the directions on this RX says 1.5mg weekly, however, this product can only deliver 1mg dose. Please advise or send corrected rX.  Thanks!     Date of last appointment with provider: 10/29/24

## 2024-12-31 NOTE — TELEPHONE ENCOUNTER
Medication refilled today in separate telephone encounter.    Thank you,  Aaron, Triage RN Paula Boswell    1:44 PM 12/31/2024

## 2024-12-31 NOTE — TELEPHONE ENCOUNTER
Dr. Orta-Please review and sign if agree.  Patient requests this medication be sent to [facility].    Thank you!  JIMBO BrunnerN, RN-[facility]

## 2025-01-26 ENCOUNTER — MYC REFILL (OUTPATIENT)
Dept: FAMILY MEDICINE | Facility: CLINIC | Age: 40
End: 2025-01-26
Payer: COMMERCIAL

## 2025-01-26 DIAGNOSIS — F90.0 ATTENTION DEFICIT HYPERACTIVITY DISORDER (ADHD), PREDOMINANTLY INATTENTIVE TYPE: ICD-10-CM

## 2025-01-27 RX ORDER — METHYLPHENIDATE HYDROCHLORIDE 18 MG/1
18 TABLET ORAL EVERY MORNING
Qty: 30 TABLET | Refills: 0 | Status: SHIPPED | OUTPATIENT
Start: 2025-01-27

## 2025-02-24 ENCOUNTER — MYC REFILL (OUTPATIENT)
Dept: FAMILY MEDICINE | Facility: CLINIC | Age: 40
End: 2025-02-24
Payer: COMMERCIAL

## 2025-02-24 DIAGNOSIS — F90.0 ATTENTION DEFICIT HYPERACTIVITY DISORDER (ADHD), PREDOMINANTLY INATTENTIVE TYPE: ICD-10-CM

## 2025-02-24 DIAGNOSIS — F41.1 ANXIETY STATE: ICD-10-CM

## 2025-02-25 ENCOUNTER — PATIENT OUTREACH (OUTPATIENT)
Dept: CARE COORDINATION | Facility: CLINIC | Age: 40
End: 2025-02-25
Payer: COMMERCIAL

## 2025-02-25 RX ORDER — SERTRALINE HYDROCHLORIDE 100 MG/1
100 TABLET, FILM COATED ORAL DAILY
Qty: 90 TABLET | Refills: 2 | OUTPATIENT
Start: 2025-02-25

## 2025-02-25 RX ORDER — METHYLPHENIDATE HYDROCHLORIDE 18 MG/1
18 TABLET ORAL EVERY MORNING
Qty: 30 TABLET | Refills: 0 | Status: SHIPPED | OUTPATIENT
Start: 2025-02-25

## 2025-02-25 RX ORDER — SERTRALINE HYDROCHLORIDE 100 MG/1
100 TABLET, FILM COATED ORAL DAILY
Qty: 90 TABLET | Refills: 2 | Status: SHIPPED | OUTPATIENT
Start: 2025-02-25

## 2025-03-11 ENCOUNTER — PATIENT OUTREACH (OUTPATIENT)
Dept: CARE COORDINATION | Facility: CLINIC | Age: 40
End: 2025-03-11
Payer: COMMERCIAL

## 2025-03-17 ENCOUNTER — TELEPHONE (OUTPATIENT)
Dept: FAMILY MEDICINE | Facility: CLINIC | Age: 40
End: 2025-03-17
Payer: COMMERCIAL

## 2025-03-18 NOTE — TELEPHONE ENCOUNTER
PA Initiation    Medication: ZEPBOUND 7.5 MG/0.5ML SC SOAJ  Insurance Company: [employer] ([facility]) - Phone [phone] Fax [phone]  Pharmacy Filling the Rx: [id number] [address]  Filling Pharmacy Phone: [phone]  Filling Pharmacy Fax: [phone]  Start Date: 3/18/2025

## 2025-03-19 NOTE — TELEPHONE ENCOUNTER
Prior Authorization Not Needed per Insurance    Medication: ZEPBOUND 7.5 MG/0.5ML SC SOAJ  Insurance Company: [employer] ([facility]) - Phone [phone] Fax [phone]  Expected CoPay: $    Pharmacy Filling the Rx: [facility] #[id number] [address]  Pharmacy Notified: YES  Patient Notified: Instructed pharmacy to notify patient when script is ready to pick-up/ship   PA NOT NEEDED.  Refill to soon until 4/7/2025 last filled 3/17/2025    PA IS GOOD FROM 3/14/2025 TO 9/14/2025

## 2025-04-02 DIAGNOSIS — F41.1 ANXIETY STATE: ICD-10-CM

## 2025-04-03 RX ORDER — SERTRALINE HYDROCHLORIDE 100 MG/1
100 TABLET, FILM COATED ORAL DAILY
Qty: 90 TABLET | Refills: 2 | OUTPATIENT
Start: 2025-04-03

## 2025-04-20 ENCOUNTER — HEALTH MAINTENANCE LETTER (OUTPATIENT)
Age: 40
End: 2025-04-20

## 2025-04-22 ENCOUNTER — MYC REFILL (OUTPATIENT)
Dept: FAMILY MEDICINE | Facility: CLINIC | Age: 40
End: 2025-04-22
Payer: COMMERCIAL

## 2025-04-22 DIAGNOSIS — F90.0 ATTENTION DEFICIT HYPERACTIVITY DISORDER (ADHD), PREDOMINANTLY INATTENTIVE TYPE: ICD-10-CM

## 2025-04-23 RX ORDER — METHYLPHENIDATE HYDROCHLORIDE 18 MG/1
18 TABLET ORAL EVERY MORNING
Qty: 30 TABLET | Refills: 0 | Status: SHIPPED | OUTPATIENT
Start: 2025-04-23

## 2025-07-07 ENCOUNTER — MYC REFILL (OUTPATIENT)
Dept: FAMILY MEDICINE | Facility: CLINIC | Age: 40
End: 2025-07-07
Payer: COMMERCIAL

## 2025-07-07 DIAGNOSIS — F90.0 ATTENTION DEFICIT HYPERACTIVITY DISORDER (ADHD), PREDOMINANTLY INATTENTIVE TYPE: ICD-10-CM

## 2025-07-08 RX ORDER — METHYLPHENIDATE HYDROCHLORIDE 18 MG/1
18 TABLET ORAL EVERY MORNING
Qty: 30 TABLET | Refills: 0 | Status: SHIPPED | OUTPATIENT
Start: 2025-07-08

## 2025-07-10 ENCOUNTER — MYC REFILL (OUTPATIENT)
Dept: FAMILY MEDICINE | Facility: CLINIC | Age: 40
End: 2025-07-10
Payer: COMMERCIAL

## 2025-07-10 DIAGNOSIS — E66.01 CLASS 2 SEVERE OBESITY DUE TO EXCESS CALORIES WITH SERIOUS COMORBIDITY AND BODY MASS INDEX (BMI) OF 36.0 TO 36.9 IN ADULT (H): ICD-10-CM

## 2025-07-10 DIAGNOSIS — E28.2 PCOS (POLYCYSTIC OVARIAN SYNDROME): ICD-10-CM

## 2025-07-10 DIAGNOSIS — E66.812 CLASS 2 SEVERE OBESITY DUE TO EXCESS CALORIES WITH SERIOUS COMORBIDITY AND BODY MASS INDEX (BMI) OF 36.0 TO 36.9 IN ADULT (H): ICD-10-CM

## 2025-07-10 DIAGNOSIS — R63.8 UNABLE TO LOSE WEIGHT: ICD-10-CM

## 2025-08-11 ENCOUNTER — MYC REFILL (OUTPATIENT)
Dept: FAMILY MEDICINE | Facility: CLINIC | Age: 40
End: 2025-08-11
Payer: COMMERCIAL

## 2025-08-11 DIAGNOSIS — F90.0 ATTENTION DEFICIT HYPERACTIVITY DISORDER (ADHD), PREDOMINANTLY INATTENTIVE TYPE: ICD-10-CM

## 2025-08-12 RX ORDER — METHYLPHENIDATE HYDROCHLORIDE 18 MG/1
18 TABLET ORAL EVERY MORNING
Qty: 30 TABLET | Refills: 0 | Status: SHIPPED | OUTPATIENT
Start: 2025-08-12

## (undated) DEVICE — DAVINCI S CANNULA SEAL 8.5-13MM 420206

## (undated) DEVICE — LINEN TOWEL PACK X5 5464

## (undated) DEVICE — DAVINCI HOT SHEARS TIP COVER  400180

## (undated) DEVICE — SOL NACL 0.9% IRRIG 1000ML BOTTLE 07138-09

## (undated) DEVICE — PACK DAVINCI GYN SMA15GDFS1

## (undated) DEVICE — ENDO TROCAR FIRST ENTRY KII FIOS Z-THRD 05X100MM CTF03

## (undated) DEVICE — ESU GROUND PAD UNIVERSAL W/O CORD

## (undated) DEVICE — SU VICRYL 4-0 PS-2 18" UND J496H

## (undated) DEVICE — CATH TRAY FOLEY SURESTEP 16FR WDRAIN BAG STLK LATEX A300316A

## (undated) DEVICE — DRSG TELFA 3X8" 1238

## (undated) DEVICE — DAVINCI S FCP BIPOLAR FENESTRATED 420205

## (undated) DEVICE — SUCTION CANISTER MEDIVAC LINER 3000ML W/LID 65651-530

## (undated) DEVICE — SURGICEL POWDER ABSORBABLE HEMOSTAT 3GM 3013SP

## (undated) DEVICE — SOL WATER IRRIG 1000ML BOTTLE 2F7114

## (undated) DEVICE — NDL 19GA 1.5"

## (undated) DEVICE — TUBING CONMED AIRSEAL SMOKE EVAC INSUFFLATION ASM-EVAC

## (undated) DEVICE — TUBING IRRIG TUR Y TYPE 96" LF 6543-01

## (undated) DEVICE — ENDO APPLICATOR SURGIFLO PLASMA COBLATION MS1995

## (undated) DEVICE — GLOVE PROTEXIS BLUE W/NEU-THERA 6.5  2D73EB65

## (undated) DEVICE — GLOVE PROTEXIS W/NEU-THERA 6.0  2D73TE60

## (undated) DEVICE — UTERINE MANIPULATOR RUMI 6.7MMX6CM UMW676

## (undated) DEVICE — DAVINCI SI DRAPE ACCESSORY KIT 3-ARM 420290

## (undated) DEVICE — ESU CORD MONOPOLAR 10'  E0510

## (undated) DEVICE — DAVINCI S CAUTERY HOOK 420183

## (undated) DEVICE — GLOVE PROTEXIS W/NEU-THERA 7.5  2D73TE75

## (undated) DEVICE — DAVINCI OBTURATOR 8MM BLADELESS 420023

## (undated) DEVICE — SOL WATER IRRIG 3000ML BAG 2B7117

## (undated) DEVICE — DAVINCI S MONOPOLAR SCISSORS PRECURVED HOT SHEARS 420179

## (undated) DEVICE — ESU CORD BIPOLAR 12' E0509

## (undated) DEVICE — KIT PATIENT POSITIONING PIGAZZI LATEX FREE 40580

## (undated) DEVICE — SUCTION IRR STRYKERFLOW II W/TIP 250-070-520

## (undated) DEVICE — ENDO TROCAR CONMED AIRSEAL BLADELESS 08X120MM IAS8-120LP

## (undated) DEVICE — SOL NACL 0.9% INJ 1000ML BAG 2B1324X

## (undated) RX ORDER — LIDOCAINE HYDROCHLORIDE 20 MG/ML
INJECTION, SOLUTION EPIDURAL; INFILTRATION; INTRACAUDAL; PERINEURAL
Status: DISPENSED
Start: 2018-09-05

## (undated) RX ORDER — DEXAMETHASONE SODIUM PHOSPHATE 4 MG/ML
INJECTION, SOLUTION INTRA-ARTICULAR; INTRALESIONAL; INTRAMUSCULAR; INTRAVENOUS; SOFT TISSUE
Status: DISPENSED
Start: 2018-09-05

## (undated) RX ORDER — PROPOFOL 10 MG/ML
INJECTION, EMULSION INTRAVENOUS
Status: DISPENSED
Start: 2018-09-05

## (undated) RX ORDER — CEFAZOLIN SODIUM 1 G/3ML
INJECTION, POWDER, FOR SOLUTION INTRAMUSCULAR; INTRAVENOUS
Status: DISPENSED
Start: 2018-09-05

## (undated) RX ORDER — HYDROMORPHONE HYDROCHLORIDE 1 MG/ML
INJECTION, SOLUTION INTRAMUSCULAR; INTRAVENOUS; SUBCUTANEOUS
Status: DISPENSED
Start: 2018-09-05

## (undated) RX ORDER — ONDANSETRON 2 MG/ML
INJECTION INTRAMUSCULAR; INTRAVENOUS
Status: DISPENSED
Start: 2018-09-05

## (undated) RX ORDER — NEOSTIGMINE METHYLSULFATE 1 MG/ML
VIAL (ML) INJECTION
Status: DISPENSED
Start: 2018-09-05

## (undated) RX ORDER — BUPIVACAINE HYDROCHLORIDE AND EPINEPHRINE 2.5; 5 MG/ML; UG/ML
INJECTION, SOLUTION EPIDURAL; INFILTRATION; INTRACAUDAL; PERINEURAL
Status: DISPENSED
Start: 2018-09-05

## (undated) RX ORDER — GLYCOPYRROLATE 0.2 MG/ML
INJECTION, SOLUTION INTRAMUSCULAR; INTRAVENOUS
Status: DISPENSED
Start: 2018-09-05

## (undated) RX ORDER — FENTANYL CITRATE 50 UG/ML
INJECTION, SOLUTION INTRAMUSCULAR; INTRAVENOUS
Status: DISPENSED
Start: 2018-09-05

## (undated) RX ORDER — CEFAZOLIN SODIUM 2 G/100ML
INJECTION, SOLUTION INTRAVENOUS
Status: DISPENSED
Start: 2018-09-05